# Patient Record
Sex: FEMALE | Race: WHITE | Employment: STUDENT | URBAN - METROPOLITAN AREA
[De-identification: names, ages, dates, MRNs, and addresses within clinical notes are randomized per-mention and may not be internally consistent; named-entity substitution may affect disease eponyms.]

---

## 2017-01-26 DIAGNOSIS — M45.9 ANKYLOSING SPONDYLITIS (H): Primary | ICD-10-CM

## 2017-01-26 DIAGNOSIS — M08.80 JIA (JUVENILE IDIOPATHIC ARTHRITIS), ENTHESITIS RELATED ARTHRITIS (H): Primary | ICD-10-CM

## 2017-01-26 RX ORDER — FOLIC ACID 1 MG/1
2 TABLET ORAL DAILY
Qty: 180 TABLET | Refills: 3 | Status: SHIPPED | OUTPATIENT
Start: 2017-01-26 | End: 2017-10-06

## 2017-01-26 RX ORDER — FOLIC ACID 1 MG/1
1 TABLET ORAL DAILY
Qty: 90 TABLET | Refills: 3 | Status: CANCELLED | OUTPATIENT
Start: 2017-01-26

## 2017-04-10 DIAGNOSIS — M45.9 ANKYLOSING SPONDYLITIS (H): ICD-10-CM

## 2017-05-15 ENCOUNTER — OFFICE VISIT (OUTPATIENT)
Dept: RHEUMATOLOGY | Facility: CLINIC | Age: 22
End: 2017-05-15
Attending: PEDIATRICS
Payer: COMMERCIAL

## 2017-05-15 VITALS
HEIGHT: 63 IN | SYSTOLIC BLOOD PRESSURE: 109 MMHG | HEART RATE: 81 BPM | TEMPERATURE: 98.2 F | WEIGHT: 153.22 LBS | BODY MASS INDEX: 27.15 KG/M2 | DIASTOLIC BLOOD PRESSURE: 75 MMHG

## 2017-05-15 DIAGNOSIS — M45.9 ANKYLOSING SPONDYLITIS (H): Primary | ICD-10-CM

## 2017-05-15 LAB
ALBUMIN SERPL-MCNC: 3.4 G/DL (ref 3.4–5)
ALBUMIN UR-MCNC: NEGATIVE MG/DL
ALP SERPL-CCNC: 61 U/L (ref 40–150)
ALT SERPL W P-5'-P-CCNC: 18 U/L (ref 0–50)
APPEARANCE UR: CLEAR
AST SERPL W P-5'-P-CCNC: 14 U/L (ref 0–35)
BACTERIA #/AREA URNS HPF: ABNORMAL /HPF
BASOPHILS # BLD AUTO: 0 10E9/L (ref 0–0.2)
BASOPHILS NFR BLD AUTO: 0.2 %
BILIRUB DIRECT SERPL-MCNC: <0.1 MG/DL (ref 0–0.2)
BILIRUB SERPL-MCNC: 0.3 MG/DL (ref 0.2–1.3)
BILIRUB UR QL STRIP: NEGATIVE
COLOR UR AUTO: ABNORMAL
CREAT SERPL-MCNC: 0.64 MG/DL (ref 0.5–1)
CRP SERPL-MCNC: 2.9 MG/L (ref 0–8)
DIFFERENTIAL METHOD BLD: NORMAL
EOSINOPHIL # BLD AUTO: 0 10E9/L (ref 0–0.7)
EOSINOPHIL NFR BLD AUTO: 0.8 %
ERYTHROCYTE [DISTWIDTH] IN BLOOD BY AUTOMATED COUNT: 13.1 % (ref 10–15)
ERYTHROCYTE [SEDIMENTATION RATE] IN BLOOD BY WESTERGREN METHOD: 8 MM/H (ref 0–20)
GFR SERPL CREATININE-BSD FRML MDRD: NORMAL ML/MIN/1.7M2
GLUCOSE UR STRIP-MCNC: NEGATIVE MG/DL
HCT VFR BLD AUTO: 36.7 % (ref 35–47)
HGB BLD-MCNC: 12.1 G/DL (ref 11.7–15.7)
HGB UR QL STRIP: NEGATIVE
IMM GRANULOCYTES # BLD: 0 10E9/L (ref 0–0.4)
IMM GRANULOCYTES NFR BLD: 0 %
KETONES UR STRIP-MCNC: NEGATIVE MG/DL
LEUKOCYTE ESTERASE UR QL STRIP: ABNORMAL
LYMPHOCYTES # BLD AUTO: 2.4 10E9/L (ref 0.8–5.3)
LYMPHOCYTES NFR BLD AUTO: 46.3 %
MCH RBC QN AUTO: 28.6 PG (ref 26.5–33)
MCHC RBC AUTO-ENTMCNC: 33 G/DL (ref 31.5–36.5)
MCV RBC AUTO: 87 FL (ref 78–100)
MONOCYTES # BLD AUTO: 0.3 10E9/L (ref 0–1.3)
MONOCYTES NFR BLD AUTO: 6.3 %
NEUTROPHILS # BLD AUTO: 2.4 10E9/L (ref 1.6–8.3)
NEUTROPHILS NFR BLD AUTO: 46.4 %
NITRATE UR QL: NEGATIVE
NRBC # BLD AUTO: 0 10*3/UL
NRBC BLD AUTO-RTO: 0 /100
PH UR STRIP: 6.5 PH (ref 5–7)
PLATELET # BLD AUTO: 255 10E9/L (ref 150–450)
PROT SERPL-MCNC: 7.1 G/DL (ref 6.8–8.8)
RBC # BLD AUTO: 4.23 10E12/L (ref 3.8–5.2)
RBC #/AREA URNS AUTO: 1 /HPF (ref 0–2)
SP GR UR STRIP: 1.02 (ref 1–1.03)
SQUAMOUS #/AREA URNS AUTO: 5 /HPF (ref 0–1)
URN SPEC COLLECT METH UR: ABNORMAL
UROBILINOGEN UR STRIP-MCNC: NORMAL MG/DL (ref 0–2)
WBC # BLD AUTO: 5.1 10E9/L (ref 4–11)
WBC #/AREA URNS AUTO: 1 /HPF (ref 0–2)

## 2017-05-15 PROCEDURE — 86140 C-REACTIVE PROTEIN: CPT | Performed by: PEDIATRICS

## 2017-05-15 PROCEDURE — 99213 OFFICE O/P EST LOW 20 MIN: CPT | Mod: ZF

## 2017-05-15 PROCEDURE — 85025 COMPLETE CBC W/AUTO DIFF WBC: CPT | Performed by: PEDIATRICS

## 2017-05-15 PROCEDURE — 81001 URINALYSIS AUTO W/SCOPE: CPT | Performed by: PEDIATRICS

## 2017-05-15 PROCEDURE — 85652 RBC SED RATE AUTOMATED: CPT | Performed by: PEDIATRICS

## 2017-05-15 PROCEDURE — 36415 COLL VENOUS BLD VENIPUNCTURE: CPT | Performed by: PEDIATRICS

## 2017-05-15 PROCEDURE — 80076 HEPATIC FUNCTION PANEL: CPT | Performed by: PEDIATRICS

## 2017-05-15 PROCEDURE — 82565 ASSAY OF CREATININE: CPT | Performed by: PEDIATRICS

## 2017-05-15 ASSESSMENT — PAIN SCALES - GENERAL: PAINLEVEL: NO PAIN (0)

## 2017-05-15 NOTE — PATIENT INSTRUCTIONS
[facility] Pediatric Rheumatology    For Help:  The Pediatric Call Center at [phone] can help with scheduling of routine follow up visits.  Diamond Stewart and Dana Pichardo are the Nurse Coordinators for the Division of Pediatric Rheumatology and can be reached directly at [phone]. They can help with questions about your child s rheumatic condition, medications, and test results.   Please try to schedule infusions 3 months in advance.  Please try to give us 72 hours or longer notice if you need to cancel infusions so other patients can benefit from this opening).  Note: Insurance authorization must be obtained before any infusion can be scheduled. If you change health insurance, you must notify our office as soon as possible, so that the infusion can be reauthorized.    For emergencies after hours or on the weekends, please call the page  at [phone] and ask to speak to the physician on-call for Pediatric Rheumatology. Please do not use [employer] for urgent requests.  Main  Services:  [phone]  o Hmong/Daryl/Central African: [phone]  o Central African: [phone]  o Faroese: [phone]

## 2017-05-15 NOTE — MR AVS SNAPSHOT
After Visit Summary   5/15/2017    Jaye Nicolas    MRN: [id number]           Patient Information     Date Of Birth          1995        Visit Information        Provider Department      5/15/2017 8:40 AM Ernesto Canela MD Peds Rheumatology        Today's Diagnoses     Ankylosing spondylitis (H)    -  1      Care Instructions        [facility] Pediatric Rheumatology    For Help:  The Pediatric Call Center at [phone] can help with scheduling of routine follow up visits.  Diamond Stewart and Dana Pichardo are the Nurse Coordinators for the Division of Pediatric Rheumatology and can be reached directly at [phone]. They can help with questions about your child s rheumatic condition, medications, and test results.   Please try to schedule infusions 3 months in advance.  Please try to give us 72 hours or longer notice if you need to cancel infusions so other patients can benefit from this opening).  Note: Insurance authorization must be obtained before any infusion can be scheduled. If you change health insurance, you must notify our office as soon as possible, so that the infusion can be reauthorized.    For emergencies after hours or on the weekends, please call the page  at [phone] and ask to speak to the physician on-call for Pediatric Rheumatology. Please do not use [employer] for urgent requests.  Main  Services:  [phone]  o Hmong/Daryl/British: [phone]  o Kuwaiti: [phone]  o Georgian: [phone]          Follow-ups after your visit        Follow-up notes from your care team     Return in about 7 months (around 12/15/2017) for Routine Visit.      Your next 10 appointments already scheduled     Dec 21, 2017  8:40 AM CST   Return Visit with MD Marlen Kinney Rheumatology ([facility])    [facility]  12th Floor  [address] [address] [phone]              Who to contact      "Please call your clinic at [phone] to:    Ask questions about your health    Make or cancel appointments    Discuss your medicines    Learn about your test results    Speak to your doctor   If you have compliments or concerns about an experience at your clinic, or if you wish to file a complaint, please contact [facility] Patient Relations at [phone] or email us at [email or web address]         Additional Information About Your Visit        [facility] Information     [employer] gives you secure access to your electronic health record. If you see a primary care provider, you can also send messages to your care team and make appointments. If you have questions, please call your primary care clinic.  If you do not have a primary care provider, please call [phone] and they will assist you.      [employer] is an electronic gateway that provides easy, online access to your medical records. With EVERYWARE, you can request a clinic appointment, read your test results, renew a prescription or communicate with your care team.     To access your existing account, please contact your [facility] or call [phone] for assistance.        Care EveryWhere ID     This is your Care EveryWhere ID. This could be used by other organizations to access your [place] medical records  [id number]        Your Vitals Were     Pulse Temperature Height BMI (Body Mass Index)          81 98.2  F (36.8  C) (Oral) 5' 2.91\" (159.8 cm) 27.22 kg/m2         Blood Pressure from Last 3 Encounters:   05/15/17 109/75   12/22/16 107/62   07/01/16 117/74    Weight from Last 3 Encounters:   05/15/17 153 lb 3.5 oz (69.5 kg) (83 %)*   12/22/16 153 lb 3.5 oz (69.5 kg) (84 %)*   07/01/16 140 lb 3.4 oz (63.6 kg) (73 %)*     * Growth percentiles are based on CDC 2-20 Years data.              We Performed the Following     CBC with platelets differential     Creatinine     CRP inflammation  "    Hepatic panel     RBC  Sed Rate     Routine UA with microscopic        Primary Care Provider Office Phone # Fax #    Xuan Quintana [phone]       [address] [place] [address]        Thank you!     [facility] you [facility]  [facility] your care. Our goal is always to provide you with excellent care. Hearing back from our patients is one way we can continue to improve our services. Please take a few minutes to complete the written survey that you may receive in the mail after your visit with us. Thank you!             Your Updated Medication List - Protect others around you: Learn how to safely use, store and throw away your medicines at www.disposemymeds.org.          This list is accurate as of: 5/15/17  9:30 AM.  Always use your most recent med list.                   Brand Name Dispense Instructions for use    BEYAZ 3-0.02-0.451 MG Tabs   Generic drug:  Drospiren-Eth Estrad-Levomefol      Take 1 tablet by mouth daily       Etanercept 50 MG/ML autoinjector    ENBREL SURECLICK    4 mL    Inject 50 mg Subcutaneous once a week       folic acid 1 MG tablet    FOLVITE    180 tablet    Take 2 tablets (2 mg) by mouth daily       loratadine 10 MG tablet    CLARITIN     Take 10 mg by mouth daily       meloxicam 15 MG tablet    MOBIC    90 tablet    Take 1 tablet (15 mg) by mouth daily       predniSONE 20 MG tablet    DELTASONE    15 tablet    Take 1 tablet (20 mg) by mouth daily

## 2017-05-15 NOTE — NURSING NOTE
"Chief Complaint   Patient presents with     Follow Up For     Ankylosing spondylitis     /75 (BP Location: Right arm, Patient Position: Chair, Cuff Size: Adult Regular)  Pulse 81  Temp 98.2  F (36.8  C) (Oral)  Ht 5' 2.91\" (159.8 cm)  Wt 153 lb 3.5 oz (69.5 kg)  BMI 27.22 kg/m2    /Radha Hernandez LPN    "

## 2017-05-15 NOTE — PROGRESS NOTES
Problem list:     Patient Active Problem List    Diagnosis Date Noted     Ankylosing spondylitis (H) 02/04/2015     Sacroiliitis, enthesitis, tenosynovitis            Allergies:     Allergies   Allergen Reactions     Seasonal Allergies            Medications:     As of completion of this visit:  Current Outpatient Prescriptions   Medication Sig Dispense Refill     Etanercept (ENBREL SURECLICK) 50 MG/ML autoinjector Inject 50 mg Subcutaneous once a week 4 mL 12     folic acid (FOLVITE) 1 MG tablet Take 2 tablets (2 mg) by mouth daily 180 tablet 3     predniSONE (DELTASONE) 20 MG tablet Take 1 tablet (20 mg) by mouth daily 15 tablet 3     meloxicam (MOBIC) 15 MG tablet Take 1 tablet (15 mg) by mouth daily 90 tablet 3     loratadine (CLARITIN) 10 MG tablet Take 10 mg by mouth daily       Drospiren-Eth Estrad-Levomefol (BEYAZ) 3-0.02-0.451 MG TABS Take 1 tablet by mouth daily        Jaye has been receiving and tolerating her medications well, without missed doses or notable side effects.         Subjective:     Jaye is a 21 year old female who was seen in Pediatric Rheumatology clinic today for follow up.  Jaye was last seen in our clinic on 12/22/2016 and returns today accompanied by her mother.  The encounter diagnosis was Ankylosing spondylitis (H).      Jaye had a good second semester and is now back home for the summer.  [place]  She has not had any major trouble with her joints, although she did finally mention that she had had some trouble with back discomfort at different times.  The 19 hour drive here is one of those examples of where she could have some trouble.  Otherwise, she has mostly been able to do things fine and has not felt the need to have joint injections done again.  She has received her Enbrel quite regularly, although today she has some nasal stuffiness, so they were thinking of holding it.  I reviewed with them again that we primarily  "hold this for an infection that requires antimicrobial therapy, not for congestion due to allergies or for minor upper respiratory infections such as cold viruses. Comprehensive Review of Systems is otherwise negative.    Information per our standardized questionnaire is as below:  Last Exam  Last Eye Exam: 11/21/16  Last Radiograph : 08/06/14  Self Report  Patient Pain Status: No Pain   Patient Global Assessment Of Disease Activity: Very Good  Score Reported By: Self  Arthritis History  Morning stiffness in the past week: < or equal to 15 min  Has your arthritis stopped from trying any athletic or rigorous activities, or interfaced with your ability to do these activities: No  Have you been limited your ability to do normal daily activities in the past week: No  Did you needed help from other people to do normal activities in the past week: No  Have you used any aids or devices to help you do normal daily activities in the past week: No  Important Medical Events  Hospitalized Since Last Visit: No         Examination:     Blood pressure 109/75, pulse 81, temperature 98.2  F (36.8  C), temperature source Oral, height 5' 2.91\" (159.8 cm), weight 153 lb 3.5 oz (69.5 kg).  Jaye appears generally well and in good spirits.  Head: Normal head and hair.  Eyes: No scleral injection, pupils normal.  Ears: Normal external structures, tympanic membranes.  Nose: No cartilage deformity, congestion.  Mouth: Normal teeth, gums, tongue, mucosa.  Throat: Normal, without erythema or exudate.  Neck: Normal, without thyromegaly  Nodes: No cervical, supraclavicular, axillary, inguinal adenopathy.  Lungs: Normal effort, clear to ausculation.  Heart: Regular rate and rhythm, S1 and S2, no murmurs; normal peripheral pulses and perfusion.  Abdomen: Soft, non-tender, without hepatomegaly, splenomegaly, or masses.  Skin: No inflammatory lesions, only normal scratches and bruises.  Nails: No pitting, infection.  Neurological: Alert, " appropriately interactive, normal cranial nerves, no deficits, normal gait.  Musculoskeletal: No evidence of current synovitis of the cervical spine, TMJ, sternoclavicular, acromioclavicular, glenohumeral, elbow, wrist, finger, lumbar spine, hip, knee, ankle, or toe joints. No tendonitis or bursitis. No enthesitis.   Axial Skeleton  Sacro-Iliac: R Tender, L Tender           Last Imaging Results:     Results for orders placed or performed during the hospital encounter of 01/18/16   CT SI Joint Injection    Narrative    CT guided bilateral sacroiliac joint injection: 1/18/2016    History: Pain.    Procedure: Informed consent was obtained from the patient. In prone  position, initial CT images were obtained. Overlying skin was prepped,  draped, and anesthetized. 22-gauge spinal needle was advanced into  both sacroiliac joints under intermittent CT guidance. After  confirmation of intra-articular position, 1 cc of 40 mg Kenalog mixed  with 1 cc 0.25% bupivacaine was injected into each sacroiliac joint.  Needle was removed. No complications. Estimated blood loss less than 1  cc.    Preprocedural pain rated at 7 / 10.  Postprocedural pain rated at zero / 10.      Impression    Impression: Successful CT-guided bilateral sacroiliac joint steroid  and anesthetic injection.    MARIAH BAKER MD            Assessment:     Jaye has a highly reassuring interval history and examination.  While she does have some mild SI tenderness, there could be mechanical factors playing a role here.  I do not think it is essential that anything change about her therapy today, but we certainly could reinject her SI joints this summer if needed (it was last done in 01/2016).     Change Since Last Visit: Same  ACR Functional Class: Normal  Provider Global Assessment Of Disease Activity: Inactive (0)  (This is measured on the scale of 0 - 10)  On Medication For Treatment Of AISHWARYA?: Yes  Normal ESR: Normal, or abnormality not due to AISHWARYA  Normal CRP:  Normal, or abnormality not due to AISHWARYA  BRANDON Status: Negative  Rheumatoid Factor Status: Negative  HLA-B27 Status: Negative  In Compliance With Screening Interval For AISHWARYA: Yes         Plan:     1. She will stay on her current therapy and have monitoring laboratory studies today.    2. No imaging studies are needed today.    3. I recommend a once a year eye examination.    4. If she has additional difficulty with her back, we can discuss PT or SI joint injections.  I would recommend calling by 08/01 if she wants to get this done before she heads back to school to avoid any difficulty with scheduling.    5. I asked to see her back on winter break.  She will be due for labs again at that time.    If there are any new questions or concerns, I would be glad to help and can be reached through our main office at [phone] or our paging  at [phone].    Ernesto Canela MD         Addendum:  Laboratory Investigations:   These results are reassuring.   Results for orders placed or performed in visit on 05/15/17 (from the past 48 hour(s))   CBC with platelets differential   Result Value Ref Range    WBC 5.1 4.0 - 11.0 10e9/L    RBC Count 4.23 3.8 - 5.2 10e12/L    Hemoglobin 12.1 11.7 - 15.7 g/dL    Hematocrit 36.7 35.0 - 47.0 %    MCV 87 78 - 100 fl    MCH 28.6 26.5 - 33.0 pg    MCHC 33.0 31.5 - 36.5 g/dL    RDW 13.1 10.0 - 15.0 %    Platelet Count 255 150 - 450 10e9/L    Diff Method Automated Method     % Neutrophils 46.4 %    % Lymphocytes 46.3 %    % Monocytes 6.3 %    % Eosinophils 0.8 %    % Basophils 0.2 %    % Immature Granulocytes 0.0 %    Nucleated RBCs 0 0 /100    Absolute Neutrophil 2.4 1.6 - 8.3 10e9/L    Absolute Lymphocytes 2.4 0.8 - 5.3 10e9/L    Absolute Monocytes 0.3 0.0 - 1.3 10e9/L    Absolute Eosinophils 0.0 0.0 - 0.7 10e9/L    Absolute Basophils 0.0 0.0 - 0.2 10e9/L    Abs Immature Granulocytes 0.0 0 - 0.4 10e9/L    Absolute Nucleated RBC 0.0    Creatinine   Result Value Ref Range     Creatinine 0.64 0.50 - 1.00 mg/dL    GFR Estimate >90  Non  GFR Calc   >60 mL/min/1.7m2    GFR Estimate If Black >90   GFR Calc   >60 mL/min/1.7m2   Hepatic panel   Result Value Ref Range    Bilirubin Direct <0.1 0.0 - 0.2 mg/dL    Bilirubin Total 0.3 0.2 - 1.3 mg/dL    Albumin 3.4 3.4 - 5.0 g/dL    Protein Total 7.1 6.8 - 8.8 g/dL    Alkaline Phosphatase 61 40 - 150 U/L    ALT 18 0 - 50 U/L    AST 14 0 - 35 U/L   CRP inflammation   Result Value Ref Range    CRP Inflammation 2.9 0.0 - 8.0 mg/L   RBC  Sed Rate   Result Value Ref Range    Sed Rate 8 0 - 20 mm/h   Routine UA with microscopic   Result Value Ref Range    Color Urine Light Yellow     Appearance Urine Clear     Glucose Urine Negative NEG mg/dL    Bilirubin Urine Negative NEG    Ketones Urine Negative NEG mg/dL    Specific Gravity Urine 1.016 1.003 - 1.035    Blood Urine Negative NEG    pH Urine 6.5 5.0 - 7.0 pH    Protein Albumin Urine Negative NEG mg/dL    Urobilinogen mg/dL Normal 0.0 - 2.0 mg/dL    Nitrite Urine Negative NEG    Leukocyte Esterase Urine Trace (A) NEG    Source Midstream Urine     WBC Urine 1 0 - 2 /HPF    RBC Urine 1 0 - 2 /HPF    Bacteria Urine Few (A) NEG /HPF    Squamous Epithelial /HPF Urine 5 (H) 0 - 1 /HPF        CC  Patient Care Team:  Xuan Quintana MD as PCP - General (Pediatrics)  Ernesto Canela MD as MD (Pediatrics)  Xuan Quintana MD as Referring Physician (Pediatrics)  Yonis Christine TRACIE F    Copy to patient  Maria Isabel, Nery Maria Isabel, Arnold  [address]  [facility] [address]

## 2017-05-15 NOTE — LETTER
5/15/2017      RE: Jaye Nicolas  [address]  [place] [address]           Problem list:     Patient Active Problem List    Diagnosis Date Noted     Ankylosing spondylitis (H) 02/04/2015     Sacroiliitis, enthesitis, tenosynovitis            Allergies:     Allergies   Allergen Reactions     Seasonal Allergies            Medications:     As of completion of this visit:  Current Outpatient Prescriptions   Medication Sig Dispense Refill     Etanercept (ENBREL SURECLICK) 50 MG/ML autoinjector Inject 50 mg Subcutaneous once a week 4 mL 12     folic acid (FOLVITE) 1 MG tablet Take 2 tablets (2 mg) by mouth daily 180 tablet 3     predniSONE (DELTASONE) 20 MG tablet Take 1 tablet (20 mg) by mouth daily 15 tablet 3     meloxicam (MOBIC) 15 MG tablet Take 1 tablet (15 mg) by mouth daily 90 tablet 3     loratadine (CLARITIN) 10 MG tablet Take 10 mg by mouth daily       Drospiren-Eth Estrad-Levomefol (BEYAZ) 3-0.02-0.451 MG TABS Take 1 tablet by mouth daily        Jaye has been receiving and tolerating her medications well, without missed doses or notable side effects.         Subjective:     Jaye is a 21 year old female who was seen in Pediatric Rheumatology clinic today for follow up.  Jaye was last seen in our clinic on 12/22/2016 and returns today accompanied by her mother.  The encounter diagnosis was Ankylosing spondylitis (H).      Jaye had a good second semester and is now back home for the summer.  [place]  She has not had any major trouble with her joints, although she did finally mention that she had had some trouble with back discomfort at different times.  The 19 hour drive here is one of those examples of where she could have some trouble.  Otherwise, she has mostly been able to do things fine and has not felt the need to have joint injections done again.  She has received her Enbrel quite regularly, although today she has some nasal stuffiness,  "so they were thinking of holding it.  I reviewed with them again that we primarily hold this for an infection that requires antimicrobial therapy, not for congestion due to allergies or for minor upper respiratory infections such as cold viruses. Comprehensive Review of Systems is otherwise negative.    Information per our standardized questionnaire is as below:  Last Exam  Last Eye Exam: 11/21/16  Last Radiograph : 08/06/14  Self Report  Patient Pain Status: No Pain   Patient Global Assessment Of Disease Activity: Very Good  Score Reported By: Self  Arthritis History  Morning stiffness in the past week: < or equal to 15 min  Has your arthritis stopped from trying any athletic or rigorous activities, or interfaced with your ability to do these activities: No  Have you been limited your ability to do normal daily activities in the past week: No  Did you needed help from other people to do normal activities in the past week: No  Have you used any aids or devices to help you do normal daily activities in the past week: No  Important Medical Events  Hospitalized Since Last Visit: No         Examination:     Blood pressure 109/75, pulse 81, temperature 98.2  F (36.8  C), temperature source Oral, height 5' 2.91\" (159.8 cm), weight 153 lb 3.5 oz (69.5 kg).  Jaye appears generally well and in good spirits.  Head: Normal head and hair.  Eyes: No scleral injection, pupils normal.  Ears: Normal external structures, tympanic membranes.  Nose: No cartilage deformity, congestion.  Mouth: Normal teeth, gums, tongue, mucosa.  Throat: Normal, without erythema or exudate.  Neck: Normal, without thyromegaly  Nodes: No cervical, supraclavicular, axillary, inguinal adenopathy.  Lungs: Normal effort, clear to ausculation.  Heart: Regular rate and rhythm, S1 and S2, no murmurs; normal peripheral pulses and perfusion.  Abdomen: Soft, non-tender, without hepatomegaly, splenomegaly, or masses.  Skin: No inflammatory lesions, only normal " scratches and bruises.  Nails: No pitting, infection.  Neurological: Alert, appropriately interactive, normal cranial nerves, no deficits, normal gait.  Musculoskeletal: No evidence of current synovitis of the cervical spine, TMJ, sternoclavicular, acromioclavicular, glenohumeral, elbow, wrist, finger, lumbar spine, hip, knee, ankle, or toe joints. No tendonitis or bursitis. No enthesitis.   Axial Skeleton  Sacro-Iliac: R Tender, L Tender           Last Imaging Results:     Results for orders placed or performed during the hospital encounter of 01/18/16   CT SI Joint Injection    Narrative    CT guided bilateral sacroiliac joint injection: 1/18/2016    History: Pain.    Procedure: Informed consent was obtained from the patient. In prone  position, initial CT images were obtained. Overlying skin was prepped,  draped, and anesthetized. 22-gauge spinal needle was advanced into  both sacroiliac joints under intermittent CT guidance. After  confirmation of intra-articular position, 1 cc of 40 mg Kenalog mixed  with 1 cc 0.25% bupivacaine was injected into each sacroiliac joint.  Needle was removed. No complications. Estimated blood loss less than 1  cc.    Preprocedural pain rated at 7 / 10.  Postprocedural pain rated at zero / 10.      Impression    Impression: Successful CT-guided bilateral sacroiliac joint steroid  and anesthetic injection.    MARIAH BAKER MD            Assessment:     Jaye has a highly reassuring interval history and examination.  While she does have some mild SI tenderness, there could be mechanical factors playing a role here.  I do not think it is essential that anything change about her therapy today, but we certainly could reinject her SI joints this summer if needed (it was last done in 01/2016).     Change Since Last Visit: Same  ACR Functional Class: Normal  Provider Global Assessment Of Disease Activity: Inactive (0)  (This is measured on the scale of 0 - 10)  On Medication For Treatment  Of AISHWARYA?: Yes  Normal ESR: Normal, or abnormality not due to AISHWARYA  Normal CRP: Normal, or abnormality not due to AISHWARYA  BRANDON Status: Negative  Rheumatoid Factor Status: Negative  HLA-B27 Status: Negative  In Compliance With Screening Interval For AISHWARYA: Yes         Plan:     1. She will stay on her current therapy and have monitoring laboratory studies today.    2. No imaging studies are needed today.    3. I recommend a once a year eye examination.    4. If she has additional difficulty with her back, we can discuss PT or SI joint injections.  I would recommend calling by 08/01 if she wants to get this done before she heads back to school to avoid any difficulty with scheduling.    5. I asked to see her back on winter break.  She will be due for labs again at that time.    If there are any new questions or concerns, I would be glad to help and can be reached through our main office at [phone] or our paging  at [phone].    Enresto Canela MD         Addendum:  Laboratory Investigations:   These results are reassuring.   Results for orders placed or performed in visit on 05/15/17 (from the past 48 hour(s))   CBC with platelets differential   Result Value Ref Range    WBC 5.1 4.0 - 11.0 10e9/L    RBC Count 4.23 3.8 - 5.2 10e12/L    Hemoglobin 12.1 11.7 - 15.7 g/dL    Hematocrit 36.7 35.0 - 47.0 %    MCV 87 78 - 100 fl    MCH 28.6 26.5 - 33.0 pg    MCHC 33.0 31.5 - 36.5 g/dL    RDW 13.1 10.0 - 15.0 %    Platelet Count 255 150 - 450 10e9/L    Diff Method Automated Method     % Neutrophils 46.4 %    % Lymphocytes 46.3 %    % Monocytes 6.3 %    % Eosinophils 0.8 %    % Basophils 0.2 %    % Immature Granulocytes 0.0 %    Nucleated RBCs 0 0 /100    Absolute Neutrophil 2.4 1.6 - 8.3 10e9/L    Absolute Lymphocytes 2.4 0.8 - 5.3 10e9/L    Absolute Monocytes 0.3 0.0 - 1.3 10e9/L    Absolute Eosinophils 0.0 0.0 - 0.7 10e9/L    Absolute Basophils 0.0 0.0 - 0.2 10e9/L    Abs Immature Granulocytes 0.0 0 - 0.4 10e9/L     Absolute Nucleated RBC 0.0    Creatinine   Result Value Ref Range    Creatinine 0.64 0.50 - 1.00 mg/dL    GFR Estimate >90  Non  GFR Calc   >60 mL/min/1.7m2    GFR Estimate If Black >90   GFR Calc   >60 mL/min/1.7m2   Hepatic panel   Result Value Ref Range    Bilirubin Direct <0.1 0.0 - 0.2 mg/dL    Bilirubin Total 0.3 0.2 - 1.3 mg/dL    Albumin 3.4 3.4 - 5.0 g/dL    Protein Total 7.1 6.8 - 8.8 g/dL    Alkaline Phosphatase 61 40 - 150 U/L    ALT 18 0 - 50 U/L    AST 14 0 - 35 U/L   CRP inflammation   Result Value Ref Range    CRP Inflammation 2.9 0.0 - 8.0 mg/L   RBC  Sed Rate   Result Value Ref Range    Sed Rate 8 0 - 20 mm/h   Routine UA with microscopic   Result Value Ref Range    Color Urine Light Yellow     Appearance Urine Clear     Glucose Urine Negative NEG mg/dL    Bilirubin Urine Negative NEG    Ketones Urine Negative NEG mg/dL    Specific Gravity Urine 1.016 1.003 - 1.035    Blood Urine Negative NEG    pH Urine 6.5 5.0 - 7.0 pH    Protein Albumin Urine Negative NEG mg/dL    Urobilinogen mg/dL Normal 0.0 - 2.0 mg/dL    Nitrite Urine Negative NEG    Leukocyte Esterase Urine Trace (A) NEG    Source Midstream Urine     WBC Urine 1 0 - 2 /HPF    RBC Urine 1 0 - 2 /HPF    Bacteria Urine Few (A) NEG /HPF    Squamous Epithelial /HPF Urine 5 (H) 0 - 1 /HPF        Ernesto Canela MD    CC  Patient Care Team:  Xuan Quintana MD as PCP - General (Pediatrics)    Yonis Christine    Copy to patient  Nery Nicolas Maria Isabel, Arnold  [address]  [facility] [address]

## 2017-07-15 ENCOUNTER — HEALTH MAINTENANCE LETTER (OUTPATIENT)
Age: 22
End: 2017-07-15

## 2017-08-31 ENCOUNTER — MYC REFILL (OUTPATIENT)
Dept: RHEUMATOLOGY | Facility: CLINIC | Age: 22
End: 2017-08-31

## 2017-08-31 DIAGNOSIS — M46.1 SACROILIITIS (H): ICD-10-CM

## 2017-08-31 DIAGNOSIS — M77.9 ENTHESITIS: ICD-10-CM

## 2017-08-31 DIAGNOSIS — M65.90 TENOSYNOVITIS: ICD-10-CM

## 2017-08-31 RX ORDER — MELOXICAM 15 MG/1
15 TABLET ORAL DAILY
Qty: 90 TABLET | Refills: 1 | Status: SHIPPED | OUTPATIENT
Start: 2017-08-31 | End: 2018-02-20

## 2017-08-31 NOTE — TELEPHONE ENCOUNTER
Message from [employer]:  Original authorizing provider: Ernesto Canela MD    Jaye Nicolas would like a refill of the following medications:  meloxicam (MOBIC) 15 MG tablet [Ernesto Canela MD]    Preferred pharmacy: [facility] [address] [place], [place] [address] [facility] [address] AT [facility] [address]  & [address] [facility]    Comment:  Can you send it to [facility] [address] [place] [facility] [phone] 90 day supply would be great, as it needs to get to [place]. Thanks so much!!

## 2017-10-06 DIAGNOSIS — M08.80 JIA (JUVENILE IDIOPATHIC ARTHRITIS), ENTHESITIS RELATED ARTHRITIS (H): ICD-10-CM

## 2017-10-06 DIAGNOSIS — M45.9 ANKYLOSING SPONDYLITIS (H): Primary | ICD-10-CM

## 2017-10-06 RX ORDER — FOLIC ACID 1 MG/1
2 TABLET ORAL DAILY
Qty: 180 TABLET | Refills: 3 | Status: SHIPPED | OUTPATIENT
Start: 2017-10-06 | End: 2017-12-28

## 2017-12-22 ENCOUNTER — OFFICE VISIT (OUTPATIENT)
Dept: RHEUMATOLOGY | Facility: CLINIC | Age: 22
End: 2017-12-22
Attending: PEDIATRICS
Payer: COMMERCIAL

## 2017-12-22 VITALS
SYSTOLIC BLOOD PRESSURE: 105 MMHG | HEART RATE: 71 BPM | WEIGHT: 151.46 LBS | HEIGHT: 63 IN | DIASTOLIC BLOOD PRESSURE: 62 MMHG | TEMPERATURE: 98.1 F | BODY MASS INDEX: 26.84 KG/M2

## 2017-12-22 DIAGNOSIS — M45.8 ANKYLOSING SPONDYLITIS OF SACRAL REGION (H): Primary | ICD-10-CM

## 2017-12-22 LAB
ALBUMIN SERPL-MCNC: 3.2 G/DL (ref 3.4–5)
ALBUMIN UR-MCNC: 10 MG/DL
ALP SERPL-CCNC: 67 U/L (ref 40–150)
ALT SERPL W P-5'-P-CCNC: 18 U/L (ref 0–50)
APPEARANCE UR: ABNORMAL
AST SERPL W P-5'-P-CCNC: 13 U/L (ref 0–45)
BACTERIA #/AREA URNS HPF: ABNORMAL /HPF
BASOPHILS # BLD AUTO: 0 10E9/L (ref 0–0.2)
BASOPHILS NFR BLD AUTO: 0.1 %
BILIRUB DIRECT SERPL-MCNC: <0.1 MG/DL (ref 0–0.2)
BILIRUB SERPL-MCNC: 0.3 MG/DL (ref 0.2–1.3)
BILIRUB UR QL STRIP: NEGATIVE
COLOR UR AUTO: YELLOW
CREAT SERPL-MCNC: 0.65 MG/DL (ref 0.52–1.04)
CRP SERPL-MCNC: <2.9 MG/L (ref 0–8)
DIFFERENTIAL METHOD BLD: ABNORMAL
EOSINOPHIL # BLD AUTO: 0.1 10E9/L (ref 0–0.7)
EOSINOPHIL NFR BLD AUTO: 0.7 %
ERYTHROCYTE [DISTWIDTH] IN BLOOD BY AUTOMATED COUNT: 13.2 % (ref 10–15)
ERYTHROCYTE [SEDIMENTATION RATE] IN BLOOD BY WESTERGREN METHOD: 9 MM/H (ref 0–20)
GFR SERPL CREATININE-BSD FRML MDRD: >90 ML/MIN/1.7M2
GLUCOSE UR STRIP-MCNC: NEGATIVE MG/DL
HCT VFR BLD AUTO: 35.1 % (ref 35–47)
HGB BLD-MCNC: 11.6 G/DL (ref 11.7–15.7)
HGB UR QL STRIP: NEGATIVE
IMM GRANULOCYTES # BLD: 0 10E9/L (ref 0–0.4)
IMM GRANULOCYTES NFR BLD: 0.1 %
KETONES UR STRIP-MCNC: NEGATIVE MG/DL
LEUKOCYTE ESTERASE UR QL STRIP: NEGATIVE
LYMPHOCYTES # BLD AUTO: 2.1 10E9/L (ref 0.8–5.3)
LYMPHOCYTES NFR BLD AUTO: 27.8 %
MCH RBC QN AUTO: 28.6 PG (ref 26.5–33)
MCHC RBC AUTO-ENTMCNC: 33 G/DL (ref 31.5–36.5)
MCV RBC AUTO: 87 FL (ref 78–100)
MONOCYTES # BLD AUTO: 0.5 10E9/L (ref 0–1.3)
MONOCYTES NFR BLD AUTO: 7.2 %
MUCOUS THREADS #/AREA URNS LPF: PRESENT /LPF
NEUTROPHILS # BLD AUTO: 4.8 10E9/L (ref 1.6–8.3)
NEUTROPHILS NFR BLD AUTO: 64.1 %
NITRATE UR QL: NEGATIVE
NRBC # BLD AUTO: 0 10*3/UL
NRBC BLD AUTO-RTO: 0 /100
PH UR STRIP: 7 PH (ref 5–7)
PLATELET # BLD AUTO: 242 10E9/L (ref 150–450)
PROT SERPL-MCNC: 6.9 G/DL (ref 6.8–8.8)
RBC # BLD AUTO: 4.06 10E12/L (ref 3.8–5.2)
RBC #/AREA URNS AUTO: <1 /HPF (ref 0–2)
SOURCE: ABNORMAL
SP GR UR STRIP: 1.02 (ref 1–1.03)
SQUAMOUS #/AREA URNS AUTO: 6 /HPF (ref 0–1)
UROBILINOGEN UR STRIP-MCNC: NORMAL MG/DL (ref 0–2)
WBC # BLD AUTO: 7.5 10E9/L (ref 4–11)
WBC #/AREA URNS AUTO: 2 /HPF (ref 0–2)

## 2017-12-22 PROCEDURE — 86140 C-REACTIVE PROTEIN: CPT | Performed by: PEDIATRICS

## 2017-12-22 PROCEDURE — 99213 OFFICE O/P EST LOW 20 MIN: CPT | Mod: ZF

## 2017-12-22 PROCEDURE — 36415 COLL VENOUS BLD VENIPUNCTURE: CPT | Performed by: PEDIATRICS

## 2017-12-22 PROCEDURE — 81001 URINALYSIS AUTO W/SCOPE: CPT | Performed by: PEDIATRICS

## 2017-12-22 PROCEDURE — 82565 ASSAY OF CREATININE: CPT | Performed by: PEDIATRICS

## 2017-12-22 PROCEDURE — 85652 RBC SED RATE AUTOMATED: CPT | Performed by: PEDIATRICS

## 2017-12-22 PROCEDURE — 85025 COMPLETE CBC W/AUTO DIFF WBC: CPT | Performed by: PEDIATRICS

## 2017-12-22 PROCEDURE — 80076 HEPATIC FUNCTION PANEL: CPT | Performed by: PEDIATRICS

## 2017-12-22 ASSESSMENT — PAIN SCALES - GENERAL: PAINLEVEL: NO PAIN (0)

## 2017-12-22 NOTE — LETTER
12/22/2017      RE: Jaye Nicolas  [address]  [place] [address]           Problem list:     Patient Active Problem List    Diagnosis Date Noted     Ankylosing spondylitis of sacral region (H) 02/04/2015     Sacroiliitis, enthesitis, tenosynovitis            Allergies:     Allergies   Allergen Reactions     Seasonal Allergies            Medications:     As of completion of this visit:  Current Outpatient Prescriptions   Medication Sig Dispense Refill     folic acid (FOLVITE) 1 MG tablet Take 2 tablets (2 mg) by mouth daily 180 tablet 3     meloxicam (MOBIC) 15 MG tablet Take 1 tablet (15 mg) by mouth daily 90 tablet 1     Etanercept (ENBREL SURECLICK) 50 MG/ML autoinjector Inject 50 mg Subcutaneous once a week 4 mL 12     loratadine (CLARITIN) 10 MG tablet Take 10 mg by mouth daily       Drospiren-Eth Estrad-Levomefol (BEYAZ) 3-0.02-0.451 MG TABS Take 1 tablet by mouth daily       predniSONE (DELTASONE) 20 MG tablet Take 1 tablet (20 mg) by mouth daily (Patient not taking: Reported on 12/22/2017) 15 tablet 3             Subjective:     Jaye is a 22 year old female who was seen in Pediatric Rheumatology clinic today for follow up.  Jaye was last seen in our clinic on 5/15/2017 and returns today accompanied by her mother.  The encounter diagnosis was Ankylosing spondylitis of sacral region (H).      Jaye did relatively well over the last 7 months as best we can tell.  As usual, she has a tendency to somewhat understate things, but she did not end up coming in over the summer for reinjection of her SI joints because she was doing well enough.  This fall she got ill and missed at least a couple of doses of Enbrel and that is when she particularly started noticed some trouble with her fingers.  She was able to keep up with her work but it did impact her ability to write.  She particularly has noted some swelling of her right fifth finger, but all of her fingers that bothers her to some degree.  Since her  "last treatment with antibiotics around Thanksgiving time she has been infection free.  Comprehensive Review of Systems is otherwise negative.    Information per our standardized questionnaire is as below:  Last Exam  Last Eye Exam: 11/21/16  Last Radiograph : 08/06/14  Self Report  Patient Pain Status: 1.5  Patient Global Assessment Of Disease Activity: 1.5  Score Reported By: Self  Arthritis History  Morning stiffness in the past week: < or equal to 15 min  Has your arthritis stopped from trying any athletic or rigorous activities, or interfaced with your ability to do these activities: No  Have you been limited your ability to do normal daily activities in the past week: No  Did you needed help from other people to do normal activities in the past week: No  Have you used any aids or devices to help you do normal daily activities in the past week: No            Examination:     Blood pressure 105/62, pulse 71, temperature 98.1  F (36.7  C), temperature source Oral, height 5' 2.99\" (160 cm), weight 151 lb 7.3 oz (68.7 kg).    Jaye appears generally well and in good spirits.  Head: Normal head and hair.  Eyes: No scleral injection, pupils normal.  Ears: Normal external structures, tympanic membranes.  Nose: No cartilage deformity, congestion.  Mouth: Normal teeth, gums, tongue, mucosa.  Throat: Normal, without erythema or exudate.  Neck: Normal, without thyromegaly  Nodes: No cervical, supraclavicular, axillary, inguinal adenopathy.  Lungs: Normal effort, clear to ausculation.  Heart: Regular rate and rhythm, S1 and S2, no murmurs; normal peripheral pulses and perfusion.  Abdomen: Soft, non-tender, without hepatomegaly, splenomegaly, or masses.  Skin: No inflammatory lesions, only normal scratches and bruises.  Nails: No pitting, infection.  Neurological: Alert, appropriately interactive, normal cranial nerves, no deficits, normal gait.  Musculoskeletal: No evidence of current synovitis of the cervical spine, TMJ, " sternoclavicular, acromioclavicular, glenohumeral, elbow, wrist, lumbar spine, hip, knee, ankle, or toe joints. No tendonitis or bursitis. No enthesitis.   Axial Skeleton  Sacro-Iliac: R Tender, L Tender (minimal)    Upper Extremity  Thumb MCP: R Swollen  Little PIP: R Swollen           Assessment:     Ankylosing spondylitis with a history of sacroiliitis and enthesitis, and now some new joint complaints.  Given infectious illnesses consider up arthritis, and she had to miss a few doses of Enbrel and not surprised that she has some difficulty.  Her mother made a comment that she seems to young to have problems like this with her fingers but I clarified that this is not an age-related phenomenon.  In addition, I clarified for Jaye that the swelling in her finger joints should go back to normal if she is on effective therapy and there should be nothing visible.    Change Since Last Visit: Same  ACR Functional Class: Normal  Provider Global Assessment Of Disease Activity: Inactive (0)  (This is measured on the scale of 0 - 10)  On Medication For Treatment Of AISHWARYA?: Yes  Normal ESR: Normal, or abnormality not due to AISHWARYA  Normal CRP: Normal, or abnormality not due to AISHWARYA  BRANDON Status: Negative  Rheumatoid Factor Status: Negative  HLA-B27 Status: Negative  In Compliance With Screening Interval For AISHWARYA: Yes         Plan:     1. Laboratory monitoring every 3-4 months to monitor medications and disease activity.  2. No imaging is needed today.  3. Continue eye examinations for uveitis monitoring every 12 months..  4. Medications as listed, with the option of taking prednisone 10 or 20 mg daily until things settle down or the next Enbrel dose can be given.  We discussed a new form of Enbrel that she could try if interested.  If things do not settle down with this plan we discussed the possible use of Humira.  5. Follow-up in 6 months.  6. If there is new insurance after the first of the year, they should be sure to call the  pediatric call center at [phone] to update the insurance information.  If there are medications that have required prior authorizations in the past, they also need to be sure to request refills earlier than normal to get new prior authorizations completed in time.       If there are any new questions or concerns, I would be glad to help and can be reached through our main office at [phone] or our paging  at [phone].    Ernesto Canela MD         Addendum:  Laboratory Investigations:     Results for orders placed or performed in visit on 12/22/17 (from the past 48 hour(s))   CBC with platelets differential   Result Value Ref Range    WBC 7.5 4.0 - 11.0 10e9/L    RBC Count 4.06 3.8 - 5.2 10e12/L    Hemoglobin 11.6 (L) 11.7 - 15.7 g/dL    Hematocrit 35.1 35.0 - 47.0 %    MCV 87 78 - 100 fl    MCH 28.6 26.5 - 33.0 pg    MCHC 33.0 31.5 - 36.5 g/dL    RDW 13.2 10.0 - 15.0 %    Platelet Count 242 150 - 450 10e9/L    Diff Method Automated Method     % Neutrophils 64.1 %    % Lymphocytes 27.8 %    % Monocytes 7.2 %    % Eosinophils 0.7 %    % Basophils 0.1 %    % Immature Granulocytes 0.1 %    Nucleated RBCs 0 0 /100    Absolute Neutrophil 4.8 1.6 - 8.3 10e9/L    Absolute Lymphocytes 2.1 0.8 - 5.3 10e9/L    Absolute Monocytes 0.5 0.0 - 1.3 10e9/L    Absolute Eosinophils 0.1 0.0 - 0.7 10e9/L    Absolute Basophils 0.0 0.0 - 0.2 10e9/L    Abs Immature Granulocytes 0.0 0 - 0.4 10e9/L    Absolute Nucleated RBC 0.0    Creatinine   Result Value Ref Range    Creatinine 0.65 0.52 - 1.04 mg/dL    GFR Estimate >90 >60 mL/min/1.7m2    GFR Estimate If Black >90 >60 mL/min/1.7m2   CRP inflammation   Result Value Ref Range    CRP Inflammation <2.9 0.0 - 8.0 mg/L   Hepatic panel   Result Value Ref Range    Bilirubin Direct <0.1 0.0 - 0.2 mg/dL    Bilirubin Total 0.3 0.2 - 1.3 mg/dL    Albumin 3.2 (L) 3.4 - 5.0 g/dL    Protein Total 6.9 6.8 - 8.8 g/dL    Alkaline Phosphatase 67 40 - 150 U/L    ALT 18 0 - 50 U/L    AST  13 0 - 45 U/L   Routine UA with microscopic   Result Value Ref Range    Color Urine Yellow     Appearance Urine Slightly Cloudy     Glucose Urine Negative NEG^Negative mg/dL    Bilirubin Urine Negative NEG^Negative    Ketones Urine Negative NEG^Negative mg/dL    Specific Gravity Urine 1.020 1.003 - 1.035    Blood Urine Negative NEG^Negative    pH Urine 7.0 5.0 - 7.0 pH    Protein Albumin Urine 10 (A) NEG^Negative mg/dL    Urobilinogen mg/dL Normal 0.0 - 2.0 mg/dL    Nitrite Urine Negative NEG^Negative    Leukocyte Esterase Urine Negative NEG^Negative    Source Midstream Urine     WBC Urine 2 0 - 2 /HPF    RBC Urine <1 0 - 2 /HPF    Bacteria Urine Many (A) NEG^Negative /HPF    Squamous Epithelial /HPF Urine 6 (H) 0 - 1 /HPF    Mucous Urine Present (A) NEG^Negative /LPF    These results are reassuring.   CC  Patient Care Team:  Xuan Quintana MD as PCP - General (Pediatrics)    Yonis Christine      Copy to patient  Nery Nicolas Thomas  [address] [address]

## 2017-12-22 NOTE — PROGRESS NOTES
Problem list:     Patient Active Problem List    Diagnosis Date Noted     Ankylosing spondylitis of sacral region (H) 02/04/2015     Sacroiliitis, enthesitis, tenosynovitis            Allergies:     Allergies   Allergen Reactions     Seasonal Allergies            Medications:     As of completion of this visit:  Current Outpatient Prescriptions   Medication Sig Dispense Refill     folic acid (FOLVITE) 1 MG tablet Take 2 tablets (2 mg) by mouth daily 180 tablet 3     meloxicam (MOBIC) 15 MG tablet Take 1 tablet (15 mg) by mouth daily 90 tablet 1     Etanercept (ENBREL SURECLICK) 50 MG/ML autoinjector Inject 50 mg Subcutaneous once a week 4 mL 12     loratadine (CLARITIN) 10 MG tablet Take 10 mg by mouth daily       Drospiren-Eth Estrad-Levomefol (BEYAZ) 3-0.02-0.451 MG TABS Take 1 tablet by mouth daily       predniSONE (DELTASONE) 20 MG tablet Take 1 tablet (20 mg) by mouth daily (Patient not taking: Reported on 12/22/2017) 15 tablet 3             Subjective:     Jaye is a 22 year old female who was seen in Pediatric Rheumatology clinic today for follow up.  Jaye was last seen in our clinic on 5/15/2017 and returns today accompanied by her mother.  The encounter diagnosis was Ankylosing spondylitis of sacral region (H).      Jaye did relatively well over the last 7 months as best we can tell.  As usual, she has a tendency to somewhat understate things, but she did not end up coming in over the summer for reinjection of her SI joints because she was doing well enough.  This fall she got ill and missed at least a couple of doses of Enbrel and that is when she particularly started noticed some trouble with her fingers.  She was able to keep up with her work but it did impact her ability to write.  She particularly has noted some swelling of her right fifth finger, but all of her fingers that bothers her to some degree.  Since her last treatment with antibiotics around Thanksgiving time she has been infection  "free.  Comprehensive Review of Systems is otherwise negative.    Information per our standardized questionnaire is as below:  Last Exam  Last Eye Exam: 11/21/16  Last Radiograph : 08/06/14  Self Report  Patient Pain Status: 1.5  Patient Global Assessment Of Disease Activity: 1.5  Score Reported By: Self  Arthritis History  Morning stiffness in the past week: < or equal to 15 min  Has your arthritis stopped from trying any athletic or rigorous activities, or interfaced with your ability to do these activities: No  Have you been limited your ability to do normal daily activities in the past week: No  Did you needed help from other people to do normal activities in the past week: No  Have you used any aids or devices to help you do normal daily activities in the past week: No            Examination:     Blood pressure 105/62, pulse 71, temperature 98.1  F (36.7  C), temperature source Oral, height 5' 2.99\" (160 cm), weight 151 lb 7.3 oz (68.7 kg).    Jaye appears generally well and in good spirits.  Head: Normal head and hair.  Eyes: No scleral injection, pupils normal.  Ears: Normal external structures, tympanic membranes.  Nose: No cartilage deformity, congestion.  Mouth: Normal teeth, gums, tongue, mucosa.  Throat: Normal, without erythema or exudate.  Neck: Normal, without thyromegaly  Nodes: No cervical, supraclavicular, axillary, inguinal adenopathy.  Lungs: Normal effort, clear to ausculation.  Heart: Regular rate and rhythm, S1 and S2, no murmurs; normal peripheral pulses and perfusion.  Abdomen: Soft, non-tender, without hepatomegaly, splenomegaly, or masses.  Skin: No inflammatory lesions, only normal scratches and bruises.  Nails: No pitting, infection.  Neurological: Alert, appropriately interactive, normal cranial nerves, no deficits, normal gait.  Musculoskeletal: No evidence of current synovitis of the cervical spine, TMJ, sternoclavicular, acromioclavicular, glenohumeral, elbow, wrist, lumbar spine, " hip, knee, ankle, or toe joints. No tendonitis or bursitis. No enthesitis.   Axial Skeleton  Sacro-Iliac: R Tender, L Tender (minimal)    Upper Extremity  Thumb MCP: R Swollen  Little PIP: R Swollen           Assessment:     Ankylosing spondylitis with a history of sacroiliitis and enthesitis, and now some new joint complaints.  Given infectious illnesses consider up arthritis, and she had to miss a few doses of Enbrel and not surprised that she has some difficulty.  Her mother made a comment that she seems to young to have problems like this with her fingers but I clarified that this is not an age-related phenomenon.  In addition, I clarified for Jaye that the swelling in her finger joints should go back to normal if she is on effective therapy and there should be nothing visible.    Change Since Last Visit: Same  ACR Functional Class: Normal  Provider Global Assessment Of Disease Activity: Inactive (0)  (This is measured on the scale of 0 - 10)  On Medication For Treatment Of AISHWARYA?: Yes  Normal ESR: Normal, or abnormality not due to AISHWARYA  Normal CRP: Normal, or abnormality not due to AISHWARYA  BRANDON Status: Negative  Rheumatoid Factor Status: Negative  HLA-B27 Status: Negative  In Compliance With Screening Interval For AISHWARYA: Yes         Plan:     1. Laboratory monitoring every 3-4 months to monitor medications and disease activity.  2. No imaging is needed today.  3. Continue eye examinations for uveitis monitoring every 12 months..  4. Medications as listed, with the option of taking prednisone 10 or 20 mg daily until things settle down or the next Enbrel dose can be given.  We discussed a new form of Enbrel that she could try if interested.  If things do not settle down with this plan we discussed the possible use of Humira.  5. Follow-up in 6 months.  6. If there is new insurance after the first of the year, they should be sure to call the pediatric call center at [phone] to update the insurance information.  If  there are medications that have required prior authorizations in the past, they also need to be sure to request refills earlier than normal to get new prior authorizations completed in time.       If there are any new questions or concerns, I would be glad to help and can be reached through our main office at [phone] or our paging  at [phone].    Ernesto Canela MD         Addendum:  Laboratory Investigations:     Results for orders placed or performed in visit on 12/22/17 (from the past 48 hour(s))   CBC with platelets differential   Result Value Ref Range    WBC 7.5 4.0 - 11.0 10e9/L    RBC Count 4.06 3.8 - 5.2 10e12/L    Hemoglobin 11.6 (L) 11.7 - 15.7 g/dL    Hematocrit 35.1 35.0 - 47.0 %    MCV 87 78 - 100 fl    MCH 28.6 26.5 - 33.0 pg    MCHC 33.0 31.5 - 36.5 g/dL    RDW 13.2 10.0 - 15.0 %    Platelet Count 242 150 - 450 10e9/L    Diff Method Automated Method     % Neutrophils 64.1 %    % Lymphocytes 27.8 %    % Monocytes 7.2 %    % Eosinophils 0.7 %    % Basophils 0.1 %    % Immature Granulocytes 0.1 %    Nucleated RBCs 0 0 /100    Absolute Neutrophil 4.8 1.6 - 8.3 10e9/L    Absolute Lymphocytes 2.1 0.8 - 5.3 10e9/L    Absolute Monocytes 0.5 0.0 - 1.3 10e9/L    Absolute Eosinophils 0.1 0.0 - 0.7 10e9/L    Absolute Basophils 0.0 0.0 - 0.2 10e9/L    Abs Immature Granulocytes 0.0 0 - 0.4 10e9/L    Absolute Nucleated RBC 0.0    Creatinine   Result Value Ref Range    Creatinine 0.65 0.52 - 1.04 mg/dL    GFR Estimate >90 >60 mL/min/1.7m2    GFR Estimate If Black >90 >60 mL/min/1.7m2   CRP inflammation   Result Value Ref Range    CRP Inflammation <2.9 0.0 - 8.0 mg/L   Hepatic panel   Result Value Ref Range    Bilirubin Direct <0.1 0.0 - 0.2 mg/dL    Bilirubin Total 0.3 0.2 - 1.3 mg/dL    Albumin 3.2 (L) 3.4 - 5.0 g/dL    Protein Total 6.9 6.8 - 8.8 g/dL    Alkaline Phosphatase 67 40 - 150 U/L    ALT 18 0 - 50 U/L    AST 13 0 - 45 U/L   Routine UA with microscopic   Result Value Ref Range    Color  Urine Yellow     Appearance Urine Slightly Cloudy     Glucose Urine Negative NEG^Negative mg/dL    Bilirubin Urine Negative NEG^Negative    Ketones Urine Negative NEG^Negative mg/dL    Specific Gravity Urine 1.020 1.003 - 1.035    Blood Urine Negative NEG^Negative    pH Urine 7.0 5.0 - 7.0 pH    Protein Albumin Urine 10 (A) NEG^Negative mg/dL    Urobilinogen mg/dL Normal 0.0 - 2.0 mg/dL    Nitrite Urine Negative NEG^Negative    Leukocyte Esterase Urine Negative NEG^Negative    Source Midstream Urine     WBC Urine 2 0 - 2 /HPF    RBC Urine <1 0 - 2 /HPF    Bacteria Urine Many (A) NEG^Negative /HPF    Squamous Epithelial /HPF Urine 6 (H) 0 - 1 /HPF    Mucous Urine Present (A) NEG^Negative /LPF    These results are reassuring.   CC  Patient Care Team:  Xuan Quintana MD as PCP - General (Pediatrics)  rEnesto Canela MD as MD (Pediatrics)  Xuan Quintana MD as Referring Physician (Pediatrics)  Yonis Christine TRACIE F    Copy to patient  Nery Nicolas Thomas  [address]  [facility] [address]

## 2017-12-22 NOTE — LETTER
2017    Xuan Quintana MD  [facility]  [address]  [place] [address]    Dear Dr. Quintana,  I am writing to report lab results on your patient.     Patient: Jaye Nicolas  :    1995  MRN:      [id number]    The results include:    Resulted Orders   CBC with platelets differential   Result Value Ref Range    WBC 7.5 4.0 - 11.0 10e9/L    RBC Count 4.06 3.8 - 5.2 10e12/L    Hemoglobin 11.6 (L) 11.7 - 15.7 g/dL    Hematocrit 35.1 35.0 - 47.0 %    MCV 87 78 - 100 fl    MCH 28.6 26.5 - 33.0 pg    MCHC 33.0 31.5 - 36.5 g/dL    RDW 13.2 10.0 - 15.0 %    Platelet Count 242 150 - 450 10e9/L    Diff Method Automated Method     % Neutrophils 64.1 %    % Lymphocytes 27.8 %    % Monocytes 7.2 %    % Eosinophils 0.7 %    % Basophils 0.1 %    % Immature Granulocytes 0.1 %    Nucleated RBCs 0 0 /100    Absolute Neutrophil 4.8 1.6 - 8.3 10e9/L    Absolute Lymphocytes 2.1 0.8 - 5.3 10e9/L    Absolute Monocytes 0.5 0.0 - 1.3 10e9/L    Absolute Eosinophils 0.1 0.0 - 0.7 10e9/L    Absolute Basophils 0.0 0.0 - 0.2 10e9/L    Abs Immature Granulocytes 0.0 0 - 0.4 10e9/L    Absolute Nucleated RBC 0.0    Creatinine   Result Value Ref Range    Creatinine 0.65 0.52 - 1.04 mg/dL    GFR Estimate >90 >60 mL/min/1.7m2      Comment:      Non  GFR Calc    GFR Estimate If Black >90 >60 mL/min/1.7m2      Comment:       GFR Calc   CRP inflammation   Result Value Ref Range    CRP Inflammation <2.9 0.0 - 8.0 mg/L   Hepatic panel   Result Value Ref Range    Bilirubin Direct <0.1 0.0 - 0.2 mg/dL    Bilirubin Total 0.3 0.2 - 1.3 mg/dL    Albumin 3.2 (L) 3.4 - 5.0 g/dL    Protein Total 6.9 6.8 - 8.8 g/dL    Alkaline Phosphatase 67 40 - 150 U/L    ALT 18 0 - 50 U/L    AST 13 0 - 45 U/L   RBC  Sed Rate   Result Value Ref Range    Sed Rate 9 0 - 20 mm/h   Routine UA with microscopic   Result Value Ref Range    Color Urine Yellow     Appearance Urine Slightly Cloudy     Glucose  Urine Negative NEG^Negative mg/dL    Bilirubin Urine Negative NEG^Negative    Ketones Urine Negative NEG^Negative mg/dL    Specific Gravity Urine 1.020 1.003 - 1.035    Blood Urine Negative NEG^Negative    pH Urine 7.0 5.0 - 7.0 pH    Protein Albumin Urine 10 (A) NEG^Negative mg/dL    Urobilinogen mg/dL Normal 0.0 - 2.0 mg/dL    Nitrite Urine Negative NEG^Negative    Leukocyte Esterase Urine Negative NEG^Negative    Source Midstream Urine     WBC Urine 2 0 - 2 /HPF    RBC Urine <1 0 - 2 /HPF    Bacteria Urine Many (A) NEG^Negative /HPF    Squamous Epithelial /HPF Urine 6 (H) 0 - 1 /HPF    Mucous Urine Present (A) NEG^Negative /LPF       These results are reassuring.  Testing should be repeated in 3-4 months.   Please feel free to contact me with any questions or concerns you might have.    Sincerely yours,    Ernesto Canela MD     CC  Patient Care Team:  Xuan Quintana MD as PCP - General (Pediatrics)  Ernesto Canela MD as MD (Pediatrics)  Xuan Quintana MD as Referring Physician (Pediatrics)  Yonis Christine  [address]  [place] [address]

## 2017-12-22 NOTE — NURSING NOTE
"Chief Complaint   Patient presents with     Follow Up For     Arthritis     /62 (BP Location: Right arm, Patient Position: Sitting, Cuff Size: Adult Regular)  Pulse 71  Temp 98.1  F (36.7  C) (Oral)  Ht 5' 2.99\" (160 cm)  Wt 151 lb 7.3 oz (68.7 kg)  BMI 26.84 kg/m2    Radha Hernandez LPN    "

## 2017-12-22 NOTE — PATIENT INSTRUCTIONS
[facility] Pediatric Rheumatology    For Help:  The Pediatric Call Center at [phone] can help with scheduling of routine follow up visits.  Diamond Stewart and Dana Pichardo are the Nurse Coordinators for the Division of Pediatric Rheumatology and can be reached directly at [phone]. They can help with questions about your child s rheumatic condition, medications, and test results.   Please try to schedule infusions 3 months in advance.  Please try to give us 72 hours or longer notice if you need to cancel infusions so other patients can benefit from this opening).  Note: Insurance authorization must be obtained before any infusion can be scheduled. If you change health insurance, you must notify our office as soon as possible, so that the infusion can be reauthorized.    For emergencies after hours or on the weekends, please call the page  at [phone] and ask to speak to the physician on-call for Pediatric Rheumatology. Please do not use [employer] for urgent requests.  Main  Services:  [phone]  o Hmong/Daryl/Malagasy: [phone]  o Mauritian: [phone]  o Polish: [phone]

## 2018-01-11 ENCOUNTER — TELEPHONE (OUTPATIENT)
Dept: RHEUMATOLOGY | Facility: CLINIC | Age: 23
End: 2018-01-11

## 2018-01-11 DIAGNOSIS — M45.9 ANKYLOSING SPONDYLITIS (H): ICD-10-CM

## 2018-01-11 DIAGNOSIS — M45.8 ANKYLOSING SPONDYLITIS OF SACRAL REGION (H): Primary | ICD-10-CM

## 2018-01-11 NOTE — TELEPHONE ENCOUNTER
PA Initiation    Medication: Etanercept (ENBREL SURECLICK) 50 MG/ML autoinjector- Initiated  Insurance Company:  [employer])  Pharmacy Filling the Rx: [address]  Filling Pharmacy Phone:    Filling Pharmacy Fax:    Start Date: 1/11/2018    Waiting for clinical questions in CMM

## 2018-01-23 NOTE — TELEPHONE ENCOUNTER
Spoke with pharmacy they are not getting paid claim  -showing PA is required    Called insurance they stated this was re-opened and does require a PA  -they stated they faxed form to us yesterday but I do not see this form yet on file  -insurance will refax to [phone]    Once receive will complete form and send back to insurance     Spoke with mom to inform   -she is concerned patient will run out of medication soon  -she will also call insurance to inform this is urgent     Will call mom back once form is sent back to insurance

## 2018-01-23 NOTE — TELEPHONE ENCOUNTER
Prior Authorization Approval    Authorization Effective Date: 1/23/2018  Authorization Expiration Date: 1/23/2020  Medication: Etanercept (ENBREL SURECLICK) 50 MG/ML autoinjector- Approved  Approved Dose/Quantity: 50mg / 4  Reference #: [id number]   Insurance Company: [employer] - Phone [phone] Fax [phone]  Expected CoPay:       CoPay Card Available:      Foundation Assistance Needed:    Which Pharmacy is filling the prescription (Not needed for infusion/clinic administered): [address]  Pharmacy Notified: Yes  Patient Notified: Yes    Insurance will fax approval letter

## 2018-02-20 DIAGNOSIS — M46.1 SACROILIITIS (H): ICD-10-CM

## 2018-02-20 DIAGNOSIS — M77.9 ENTHESITIS: ICD-10-CM

## 2018-02-20 DIAGNOSIS — M65.90 TENOSYNOVITIS: ICD-10-CM

## 2018-02-20 RX ORDER — MELOXICAM 15 MG/1
15 TABLET ORAL DAILY
Qty: 90 TABLET | Refills: 1 | Status: SHIPPED | OUTPATIENT
Start: 2018-02-20 | End: 2018-12-07

## 2018-05-18 ENCOUNTER — OFFICE VISIT (OUTPATIENT)
Dept: RHEUMATOLOGY | Facility: CLINIC | Age: 23
End: 2018-05-18
Attending: PEDIATRICS
Payer: COMMERCIAL

## 2018-05-18 VITALS
TEMPERATURE: 97.5 F | SYSTOLIC BLOOD PRESSURE: 115 MMHG | WEIGHT: 151.46 LBS | BODY MASS INDEX: 26.84 KG/M2 | DIASTOLIC BLOOD PRESSURE: 71 MMHG | HEIGHT: 63 IN | HEART RATE: 77 BPM

## 2018-05-18 DIAGNOSIS — M45.8 ANKYLOSING SPONDYLITIS OF SACRAL REGION (H): Primary | ICD-10-CM

## 2018-05-18 DIAGNOSIS — Z79.620 ON ETANERCEPT THERAPY: ICD-10-CM

## 2018-05-18 LAB
ALBUMIN UR-MCNC: NEGATIVE MG/DL
ALT SERPL W P-5'-P-CCNC: 15 U/L (ref 0–50)
APPEARANCE UR: CLEAR
BACTERIA #/AREA URNS HPF: ABNORMAL /HPF
BASOPHILS # BLD AUTO: 0 10E9/L (ref 0–0.2)
BASOPHILS NFR BLD AUTO: 0.1 %
BILIRUB UR QL STRIP: NEGATIVE
COLOR UR AUTO: ABNORMAL
CREAT SERPL-MCNC: 0.66 MG/DL (ref 0.52–1.04)
CRP SERPL-MCNC: 6.7 MG/L (ref 0–8)
DIFFERENTIAL METHOD BLD: NORMAL
EOSINOPHIL # BLD AUTO: 0.1 10E9/L (ref 0–0.7)
EOSINOPHIL NFR BLD AUTO: 0.9 %
ERYTHROCYTE [DISTWIDTH] IN BLOOD BY AUTOMATED COUNT: 13 % (ref 10–15)
ERYTHROCYTE [SEDIMENTATION RATE] IN BLOOD BY WESTERGREN METHOD: 9 MM/H (ref 0–20)
GFR SERPL CREATININE-BSD FRML MDRD: >90 ML/MIN/1.7M2
GLUCOSE UR STRIP-MCNC: NEGATIVE MG/DL
HCT VFR BLD AUTO: 38.2 % (ref 35–47)
HGB BLD-MCNC: 12.2 G/DL (ref 11.7–15.7)
HGB UR QL STRIP: NEGATIVE
IMM GRANULOCYTES # BLD: 0 10E9/L (ref 0–0.4)
IMM GRANULOCYTES NFR BLD: 0.1 %
KETONES UR STRIP-MCNC: NEGATIVE MG/DL
LEUKOCYTE ESTERASE UR QL STRIP: ABNORMAL
LYMPHOCYTES # BLD AUTO: 2.1 10E9/L (ref 0.8–5.3)
LYMPHOCYTES NFR BLD AUTO: 31.2 %
MCH RBC QN AUTO: 27.9 PG (ref 26.5–33)
MCHC RBC AUTO-ENTMCNC: 31.9 G/DL (ref 31.5–36.5)
MCV RBC AUTO: 87 FL (ref 78–100)
MONOCYTES # BLD AUTO: 0.4 10E9/L (ref 0–1.3)
MONOCYTES NFR BLD AUTO: 6.5 %
MUCOUS THREADS #/AREA URNS LPF: PRESENT /LPF
NEUTROPHILS # BLD AUTO: 4.2 10E9/L (ref 1.6–8.3)
NEUTROPHILS NFR BLD AUTO: 61.2 %
NITRATE UR QL: NEGATIVE
NRBC # BLD AUTO: 0 10*3/UL
NRBC BLD AUTO-RTO: 0 /100
PH UR STRIP: 5 PH (ref 5–7)
PLATELET # BLD AUTO: 244 10E9/L (ref 150–450)
RBC # BLD AUTO: 4.37 10E12/L (ref 3.8–5.2)
RBC #/AREA URNS AUTO: 3 /HPF (ref 0–2)
SOURCE: ABNORMAL
SP GR UR STRIP: 1.02 (ref 1–1.03)
SQUAMOUS #/AREA URNS AUTO: 4 /HPF (ref 0–1)
UROBILINOGEN UR STRIP-MCNC: NORMAL MG/DL (ref 0–2)
WBC # BLD AUTO: 6.8 10E9/L (ref 4–11)
WBC #/AREA URNS AUTO: 4 /HPF (ref 0–5)

## 2018-05-18 PROCEDURE — G0463 HOSPITAL OUTPT CLINIC VISIT: HCPCS | Mod: ZF

## 2018-05-18 PROCEDURE — 86480 TB TEST CELL IMMUN MEASURE: CPT | Performed by: PEDIATRICS

## 2018-05-18 PROCEDURE — 86140 C-REACTIVE PROTEIN: CPT | Performed by: PEDIATRICS

## 2018-05-18 PROCEDURE — 81001 URINALYSIS AUTO W/SCOPE: CPT | Performed by: PEDIATRICS

## 2018-05-18 PROCEDURE — 82565 ASSAY OF CREATININE: CPT | Performed by: PEDIATRICS

## 2018-05-18 PROCEDURE — 85652 RBC SED RATE AUTOMATED: CPT | Performed by: PEDIATRICS

## 2018-05-18 PROCEDURE — 36415 COLL VENOUS BLD VENIPUNCTURE: CPT | Performed by: PEDIATRICS

## 2018-05-18 PROCEDURE — 86225 DNA ANTIBODY NATIVE: CPT | Performed by: PEDIATRICS

## 2018-05-18 PROCEDURE — 84460 ALANINE AMINO (ALT) (SGPT): CPT | Performed by: PEDIATRICS

## 2018-05-18 PROCEDURE — 85025 COMPLETE CBC W/AUTO DIFF WBC: CPT | Performed by: PEDIATRICS

## 2018-05-18 RX ORDER — HYDROXYCHLOROQUINE SULFATE 200 MG/1
200 TABLET, FILM COATED ORAL DAILY
Qty: 30 TABLET | Refills: 3 | Status: SHIPPED | OUTPATIENT
Start: 2018-05-18 | End: 2019-01-04

## 2018-05-18 ASSESSMENT — PAIN SCALES - GENERAL: PAINLEVEL: NO PAIN (0)

## 2018-05-18 NOTE — MR AVS SNAPSHOT
After Visit Summary   5/18/2018    Jaye Nicolas    MRN: [id number]           Patient Information     Date Of Birth          1995        Visit Information        Provider Department      5/18/2018 8:40 AM Ernesto Canela MD Peds Rheumatology        Today's Diagnoses     Ankylosing spondylitis of sacral region (H)    -  1    On etanercept therapy          Care Instructions      Try Plaquenil.  If no benefit before you start school, ok to stop.    [facility] Pediatric Rheumatology    For Help:  The Pediatric Call Center at [phone] can help with scheduling of routine follow up visits.  Diamond Stewart and Dana Pichardo are the Nurse Coordinators for the Division of Pediatric Rheumatology and can be reached directly at [phone]. They can help with questions about your child s rheumatic condition, medications, and test results.   Please try to schedule infusions 3 months in advance.  Please try to give us 72 hours or longer notice if you need to cancel infusions so other patients can benefit from this opening).  Note: Insurance authorization must be obtained before any infusion can be scheduled. If you change health insurance, you must notify our office as soon as possible, so that the infusion can be reauthorized.    For emergencies after hours or on the weekends, please call the page  at [phone] and ask to speak to the physician on-call for Pediatric Rheumatology. Please do not use [employer] for urgent requests.  Main  Services:  [phone]  o Hmong/Australian/Ethiopian: [phone]  o Cambodian: [phone]  o Italian: [phone]            Follow-ups after your visit        Follow-up notes from your care team     Return in about 6 months (around 11/18/2018) for Routine Visit.      Who to contact     Please call your clinic at [phone] to:    Ask questions about your health    Make or cancel appointments    Discuss your medicines    Learn about  "your test results    Speak to your doctor            Additional Information About Your Visit        [employer] Information     [employer] gives you secure access to your electronic health record. If you see a primary care provider, you can also send messages to your care team and make appointments. If you have questions, please call your primary care clinic.  If you do not have a primary care provider, please call [phone] and they will assist you.      [employer] is an electronic gateway that provides easy, online access to your medical records. With [employer], you can request a clinic appointment, read your test results, renew a prescription or communicate with your care team.     To access your existing account, please contact your [facility] or call [phone] for assistance.        Care EveryWhere ID     This is your Care EveryWhere ID. This could be used by other organizations to access your [employer] medical records  [id number]        Your Vitals Were     Pulse Temperature Height BMI (Body Mass Index)          77 97.5  F (36.4  C) (Oral) 5' 3.39\" (161 cm) 26.5 kg/m2         Blood Pressure from Last 3 Encounters:   05/18/18 115/71   12/22/17 105/62   05/15/17 109/75    Weight from Last 3 Encounters:   05/18/18 151 lb 7.3 oz (68.7 kg)   12/22/17 151 lb 7.3 oz (68.7 kg)   05/15/17 153 lb 3.5 oz (69.5 kg) (83 %)*     * Growth percentiles are based on CDC 2-20 Years data.              We Performed the Following     ALT     CBC with platelets differential     Creatinine     CRP inflammation     DNA double stranded antibodies     M Tuberculosis by Quantiferon     RBC  Sed Rate     Routine UA with microscopic          Today's Medication Changes          These changes are accurate as of 5/18/18  9:15 AM.  If you have any questions, ask your nurse or doctor.               Start taking these medicines.        Dose/Directions    hydroxychloroquine 200 MG tablet   Commonly known as:  PLAQUENIL "   Used for:  Ankylosing spondylitis of sacral region (H)   Started by:  Ernesto Canela MD        Dose:  200 mg   Take 1 tablet (200 mg) by mouth daily   Quantity:  30 tablet   Refills:  3         These medicines have changed or have updated prescriptions.        Dose/Directions    predniSONE 20 MG tablet   Commonly known as:  DELTASONE   This may have changed:    - when to take this  - reasons to take this   Used for:  Ankylosing spondylitis of sacral region (H)        Dose:  20 mg   Take 1 tablet (20 mg) by mouth daily   Quantity:  15 tablet   Refills:  3            Where to get your medicines      [id number] - [place], [place] - [address] AT [facility] OF [facility] [address] [facility]  [address], [facility] [address]     Phone:  [phone]     hydroxychloroquine 200 MG tablet                Primary Care Provider Office Phone # Fax #    Xuan Quintana [phone]       [facility] [address] [address]  [facility] [address]        Equal Access to Services     [facility] AH: Hadii aad ku hadasho Soomaali, waaxda luqadaha, qaybta kaalmada adeegyamonica, kate calixto . So [facility] [phone].    ATENCIÓN: Si habla español, tiene a balderrama disposición servicios gratuitos de asistencia lingüística. [facility] [phone].    We comply with applicable federal civil rights laws and [place] laws. We do not discriminate on the basis of race, color, national origin, age, disability, sex, sexual orientation, or gender identity.            Thank you!     [facility]. Our goal is always to provide you with excellent care. Hearing back from our patients is one way we can continue to improve our services. Please take a few minutes to complete the written survey that you may receive in the mail after your visit with us. Thank you!             Your Updated Medication List - Protect others  around you: Learn how to safely use, store and throw away your medicines at www.disposemymeds.org.          This list is accurate as of 5/18/18  9:15 AM.  Always use your most recent med list.                   Brand Name Dispense Instructions for use Diagnosis    BEYAZ 3-0.02-0.451 MG Tabs   Generic drug:  Drospiren-Eth Estrad-Levomefol      Take 1 tablet by mouth daily        Etanercept 50 MG/ML autoinjector    ENBREL SURECLICK    4 mL    Inject 50 mg Subcutaneous once a week    Ankylosing spondylitis of sacral region (H)       folic acid 1 MG tablet    FOLVITE    180 tablet    Take 2 tablets (2 mg) by mouth daily    Ankylosing spondylitis of sacral region (H)       hydroxychloroquine 200 MG tablet    PLAQUENIL    30 tablet    Take 1 tablet (200 mg) by mouth daily    Ankylosing spondylitis of sacral region (H)       loratadine 10 MG tablet    CLARITIN     Take 10 mg by mouth daily    Ankylosing spondylitis (H)       meloxicam 15 MG tablet    MOBIC    90 tablet    Take 1 tablet (15 mg) by mouth daily    Tenosynovitis, Sacroiliitis (H), Enthesitis       predniSONE 20 MG tablet    DELTASONE    15 tablet    Take 1 tablet (20 mg) by mouth daily    Ankylosing spondylitis of sacral region (H)

## 2018-05-18 NOTE — LETTER
5/18/2018      RE: Jaye Nicolas  [address]  [place] [address]           Problem list:     Patient Active Problem List    Diagnosis Date Noted     Ankylosing spondylitis of sacral region (H) 02/04/2015     Sacroiliitis, enthesitis, tenosynovitis            Allergies:     Allergies   Allergen Reactions     Seasonal Allergies            Medications:     As of completion of this visit:  Current Outpatient Prescriptions   Medication Sig Dispense Refill     Drospiren-Eth Estrad-Levomefol (BEYAZ) 3-0.02-0.451 MG TABS Take 1 tablet by mouth daily       Etanercept (ENBREL SURECLICK) 50 MG/ML autoinjector Inject 50 mg Subcutaneous once a week 4 mL 12     folic acid (FOLVITE) 1 MG tablet Take 2 tablets (2 mg) by mouth daily 180 tablet 3     hydroxychloroquine (PLAQUENIL) 200 MG tablet Take 1 tablet (200 mg) by mouth daily 30 tablet 3     loratadine (CLARITIN) 10 MG tablet Take 10 mg by mouth daily       meloxicam (MOBIC) 15 MG tablet Take 1 tablet (15 mg) by mouth daily 90 tablet 1     predniSONE (DELTASONE) 20 MG tablet Take 1 tablet (20 mg) by mouth daily (Patient taking differently: Take 20 mg by mouth as needed ) 15 tablet 3             Subjective:     Jaye is a 22 year old female who was seen in Pediatric Rheumatology clinic today for follow up.  Jaye was last seen in our clinic on 12/22/2017 and returns today accompanied by her mother.  The primary encounter diagnosis was Ankylosing spondylitis of sacral region (H). A diagnosis of On etanercept therapy was also pertinent to this visit.      Jaye reports that things went well this past semester except for ongoing stiffness with her fingers.  The right little finger particularly is bothersome to her and she feels like it is swollen.  Her morning stiffness is under 30 minutes generally and she does not have any major functional limitations.  Her mother has a question regarding whether the shingles vaccine would be a good idea for her because of her use of  "Enbrel, and I am not aware of any advice for younger individuals like herself to get this vaccine for this reason.    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     Comprehensive Review of Systems is otherwise negative for any new health concerns today.    Information per our standardized questionnaire is as below:  Last Exam  Last Eye Exam: 11/23/17  Last Radiograph : 08/06/14  Self Report  Patient Pain Status: 1.5  Patient Global Assessment Of Disease Activity: 1.5  Score Reported By: Self  Arthritis History  Morning stiffness in the past week: None  Has your arthritis stopped from trying any athletic or rigorous activities, or interfaced with your ability to do these activities: No  Have you been limited your ability to do normal daily activities in the past week: No  Did you needed help from other people to do normal activities in the past week: No  Have you used any aids or devices to help you do normal daily activities in the past week: No            Examination:     Blood pressure 115/71, pulse 77, temperature 97.5  F (36.4  C), temperature source Oral, height 5' 3.39\" (161 cm), weight 151 lb 7.3 oz (68.7 kg).    Jaye appears generally well and in good spirits.  Head: Normal head and hair.  Eyes: No scleral injection, pupils normal.  Ears: Normal external structures, tympanic membranes.  Nose: No cartilage deformity, congestion.  Mouth: Normal teeth, gums, tongue, mucosa.  Throat: Normal, without erythema or exudate.  Neck: Normal, without thyromegaly  Nodes: No cervical, supraclavicular, axillary, inguinal adenopathy.  Lungs: Normal effort, clear to ausculation.  Heart: Regular rate and rhythm, S1 and S2, no murmurs; normal peripheral pulses and perfusion.  Abdomen: Soft, non-tender, without hepatomegaly, splenomegaly, or masses.  Skin: No inflammatory " lesions, only normal scratches and bruises.  Nails: No pitting, infection.  Neurological: Alert, appropriately interactive, normal cranial nerves, no deficits, normal gait.  Musculoskeletal: No evidence of current synovitis of the cervical spine, TMJ, sternoclavicular, acromioclavicular, glenohumeral, elbow, wrist, finger, lumbar spine, sacroiliac, hip, knee, ankle, or toe joints. No tendonitis or bursitis. No enthesitis.  She is generally hypermobile, especially in the fingers.  There may be a small angular deformity of the right little finger PIP joint but I cannot appreciate any capsular distention from fluid or synovial thickening.  There is no loss of range of motion in this joint to indicate active synovitis or significant past synovitis.           Assessment:     Spondyloarthropathy (ankylosing spondylitis), with ongoing hand symptoms.  We see this not uncommonly in our patients with spondyloarthropathy, but often this is the only problem, without evolution of deformity or damage.  In her case I am not sure that she has any inflammatory changes due to enthesitis or arthritis of her hands.  To sort this out we could consider Doppler ultrasound or MRI of the right little finger PIP joint.  If there is a question as to whether we are dealing with mechanical problem or an inflammatory problem probably the quickest way to sort this out would be to do a prednisone burst.    However my leaning is that this is predominantly mechanical in nature.  I have other patients who have benefited from seeing hand orthopedics or occupational therapist who specializes in hypermobility.  The best option if she needs additional anti-inflammatory therapy, given that she has been on methotrexate and sulfasalazine without real benefit in the past, would be to consider hydroxychloroquine.  Failing that, if there is a suggestion that it is ongoing inflammation and I would try a different TNF inhibitor.      Change Since Last Visit:  Somewhat Better  ACR Functional Class: Normal  Provider Global Assessment Of Disease Activity: Inactive (0)  (This is measured on the scale of 0 - 10)  On Medication For Treatment Of AISHWARYA?: Yes  Normal ESR: Normal, or abnormality not due to AISHWARYA  Normal CRP: Normal, or abnormality not due to AISHWARYA  BRANDON Status: Negative  Rheumatoid Factor Status: Negative  HLA-B27 Status: Negative  In Compliance With Screening Interval For AISHWARYA: Yes         Plan:     1. Laboratory monitoring every 4-6 months to monitor medications and disease activity.  2. No imaging is needed today.  3. Consider eye examinations for uveitis monitoring every 12 months.  4. Consider seeing hand orthopedics, or occupational therapy.  5. Medications as listed, with the option of adding hydroxychloroquine 200 mg orally once daily.  If this provides no benefit whatsoever over the summer than it can just be stopped.  6. Follow-up in 6 months or sooner if there are new concerns.     If there are any new questions or concerns, I would be glad to help and can be reached through our main office at [phone] or our paging  at [phone].    Ernesto Canela MD           Addendum:  Laboratory Investigations:     Results for orders placed or performed in visit on 05/18/18 (from the past 48 hour(s))   CBC with platelets differential   Result Value Ref Range    WBC 6.8 4.0 - 11.0 10e9/L    RBC Count 4.37 3.8 - 5.2 10e12/L    Hemoglobin 12.2 11.7 - 15.7 g/dL    Hematocrit 38.2 35.0 - 47.0 %    MCV 87 78 - 100 fl    MCH 27.9 26.5 - 33.0 pg    MCHC 31.9 31.5 - 36.5 g/dL    RDW 13.0 10.0 - 15.0 %    Platelet Count 244 150 - 450 10e9/L    Diff Method Automated Method     % Neutrophils 61.2 %    % Lymphocytes 31.2 %    % Monocytes 6.5 %    % Eosinophils 0.9 %    % Basophils 0.1 %    % Immature Granulocytes 0.1 %    Nucleated RBCs 0 0 /100    Absolute Neutrophil 4.2 1.6 - 8.3 10e9/L    Absolute Lymphocytes 2.1 0.8 - 5.3 10e9/L    Absolute Monocytes 0.4 0.0 - 1.3  10e9/L    Absolute Eosinophils 0.1 0.0 - 0.7 10e9/L    Absolute Basophils 0.0 0.0 - 0.2 10e9/L    Abs Immature Granulocytes 0.0 0 - 0.4 10e9/L    Absolute Nucleated RBC 0.0    Creatinine   Result Value Ref Range    Creatinine 0.66 0.52 - 1.04 mg/dL    GFR Estimate >90 >60 mL/min/1.7m2    GFR Estimate If Black >90 >60 mL/min/1.7m2   CRP inflammation   Result Value Ref Range    CRP Inflammation 6.7 0.0 - 8.0 mg/L   RBC  Sed Rate   Result Value Ref Range    Sed Rate 9 0 - 20 mm/h   Routine UA with microscopic   Result Value Ref Range    Color Urine Light Yellow     Appearance Urine Clear     Glucose Urine Negative NEG^Negative mg/dL    Bilirubin Urine Negative NEG^Negative    Ketones Urine Negative NEG^Negative mg/dL    Specific Gravity Urine 1.016 1.003 - 1.035    Blood Urine Negative NEG^Negative    pH Urine 5.0 5.0 - 7.0 pH    Protein Albumin Urine Negative NEG^Negative mg/dL    Urobilinogen mg/dL Normal 0.0 - 2.0 mg/dL    Nitrite Urine Negative NEG^Negative    Leukocyte Esterase Urine Moderate (A) NEG^Negative    Source Midstream Urine     WBC Urine 4 0 - 5 /HPF    RBC Urine 3 (H) 0 - 2 /HPF    Bacteria Urine Many (A) NEG^Negative /HPF    Squamous Epithelial /HPF Urine 4 (H) 0 - 1 /HPF    Mucous Urine Present (A) NEG^Negative /LPF   ALT   Result Value Ref Range    ALT 15 0 - 50 U/L    These results are reassuring.       CC  Patient Care Team:  Xuan Quintana MD as PCP - General (Pediatrics)    Yonis Christine    Copy to patient    Parent(s) of Jaye Pageum  [address] [facility] [place] [address]

## 2018-05-18 NOTE — LETTER
May 27, 2018    Xuan Quintana MD  [facility]  [address]  [place] [address]    Dear Dr. Quintana,    I am writing to report lab results from 2018 on your patient.     Patient: Jaye Nicolas  :    1995  MRN:      [id number]    The results include:    Resulted Orders   CBC with platelets differential   Result Value Ref Range    WBC 6.8 4.0 - 11.0 10e9/L    RBC Count 4.37 3.8 - 5.2 10e12/L    Hemoglobin 12.2 11.7 - 15.7 g/dL    Hematocrit 38.2 35.0 - 47.0 %    MCV 87 78 - 100 fl    MCH 27.9 26.5 - 33.0 pg    MCHC 31.9 31.5 - 36.5 g/dL    RDW 13.0 10.0 - 15.0 %    Platelet Count 244 150 - 450 10e9/L    Diff Method Automated Method     % Neutrophils 61.2 %    % Lymphocytes 31.2 %    % Monocytes 6.5 %    % Eosinophils 0.9 %    % Basophils 0.1 %    % Immature Granulocytes 0.1 %    Nucleated RBCs 0 0 /100    Absolute Neutrophil 4.2 1.6 - 8.3 10e9/L    Absolute Lymphocytes 2.1 0.8 - 5.3 10e9/L    Absolute Monocytes 0.4 0.0 - 1.3 10e9/L    Absolute Eosinophils 0.1 0.0 - 0.7 10e9/L    Absolute Basophils 0.0 0.0 - 0.2 10e9/L    Abs Immature Granulocytes 0.0 0 - 0.4 10e9/L    Absolute Nucleated RBC 0.0    Creatinine   Result Value Ref Range    Creatinine 0.66 0.52 - 1.04 mg/dL    GFR Estimate >90 >60 mL/min/1.7m2      Comment:      Non  GFR Calc    GFR Estimate If Black >90 >60 mL/min/1.7m2      Comment:       GFR Calc   CRP inflammation   Result Value Ref Range    CRP Inflammation 6.7 0.0 - 8.0 mg/L   RBC  Sed Rate   Result Value Ref Range    Sed Rate 9 0 - 20 mm/h   Routine UA with microscopic   Result Value Ref Range    Color Urine Light Yellow     Appearance Urine Clear     Glucose Urine Negative NEG^Negative mg/dL    Bilirubin Urine Negative NEG^Negative    Ketones Urine Negative NEG^Negative mg/dL    Specific Gravity Urine 1.016 1.003 - 1.035    Blood Urine Negative NEG^Negative    pH Urine 5.0 5.0 - 7.0 pH    Protein Albumin Urine Negative  NEG^Negative mg/dL    Urobilinogen mg/dL Normal 0.0 - 2.0 mg/dL    Nitrite Urine Negative NEG^Negative    Leukocyte Esterase Urine Moderate (A) NEG^Negative    Source Midstream Urine     WBC Urine 4 0 - 5 /HPF    RBC Urine 3 (H) 0 - 2 /HPF    Bacteria Urine Many (A) NEG^Negative /HPF    Squamous Epithelial /HPF Urine 4 (H) 0 - 1 /HPF    Mucous Urine Present (A) NEG^Negative /LPF   ALT   Result Value Ref Range    ALT 15 0 - 50 U/L   DNA double stranded antibodies   Result Value Ref Range    DNA-ds 3 <10 IU/mL      Comment:      Negative   M Tuberculosis by Quantiferon   Result Value Ref Range    M Tuberculosis Result Negative NEG^Negative    M Tuberculosis Antigen Value 0.03 IU/mL      Comment:      This is a qualitative test.  The TB antigen IU/mL value is required for   documentation on certain government reporting forms but this value should not   be used to monitor disease progression or response to therapy.  Diagnosing or excluding tuberculosis disease, and assessing the probability of   LTBI, require a combination of epidemiological, historical, medical and   diagnostic findings that should be taken into account when interpreting   QuantiFERON TB results.       These results are reassuring.  Please feel free to contact me with any questions or concerns you might have.    Sincerely yours,    Ernesto Canela MD     CC  Patient Care Team:  Xuan Quintana MD as PCP - General (Pediatrics)  Ernesto Canela MD as MD (Pediatrics)  Xuan Qunitana MD as Referring Physician (Pediatrics)  Yonis Christine  [address] [facility] [address]

## 2018-05-18 NOTE — NURSING NOTE
"Chief Complaint   Patient presents with     Follow Up For     Ankylosing spondylitis of sacral region     /71  Pulse 77  Temp 97.5  F (36.4  C) (Oral)  Ht 5' 3.39\" (161 cm)  Wt 151 lb 7.3 oz (68.7 kg)  BMI 26.5 kg/m2    PT. DECLINED LMX    Birgit Maldonado CMA    "

## 2018-05-18 NOTE — PROGRESS NOTES
Problem list:     Patient Active Problem List    Diagnosis Date Noted     Ankylosing spondylitis of sacral region (H) 02/04/2015     Sacroiliitis, enthesitis, tenosynovitis            Allergies:     Allergies   Allergen Reactions     Seasonal Allergies            Medications:     As of completion of this visit:  Current Outpatient Prescriptions   Medication Sig Dispense Refill     Drospiren-Eth Estrad-Levomefol (BEYAZ) 3-0.02-0.451 MG TABS Take 1 tablet by mouth daily       Etanercept (ENBREL SURECLICK) 50 MG/ML autoinjector Inject 50 mg Subcutaneous once a week 4 mL 12     folic acid (FOLVITE) 1 MG tablet Take 2 tablets (2 mg) by mouth daily 180 tablet 3     hydroxychloroquine (PLAQUENIL) 200 MG tablet Take 1 tablet (200 mg) by mouth daily 30 tablet 3     loratadine (CLARITIN) 10 MG tablet Take 10 mg by mouth daily       meloxicam (MOBIC) 15 MG tablet Take 1 tablet (15 mg) by mouth daily 90 tablet 1     predniSONE (DELTASONE) 20 MG tablet Take 1 tablet (20 mg) by mouth daily (Patient taking differently: Take 20 mg by mouth as needed ) 15 tablet 3             Subjective:     Jaye is a 22 year old female who was seen in Pediatric Rheumatology clinic today for follow up.  Jaye was last seen in our clinic on 12/22/2017 and returns today accompanied by her mother.  The primary encounter diagnosis was Ankylosing spondylitis of sacral region (H). A diagnosis of On etanercept therapy was also pertinent to this visit.      Jaye reports that things went well this past semester except for ongoing stiffness with her fingers.  The right little finger particularly is bothersome to her and she feels like it is swollen.  Her morning stiffness is under 30 minutes generally and she does not have any major functional limitations.  Her mother has a question regarding whether the shingles vaccine would be a good idea for her because of her use of Enbrel, and I am not aware of any advice for younger individuals like herself to  "get this vaccine for this reason.    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     Comprehensive Review of Systems is otherwise negative for any new health concerns today.    Information per our standardized questionnaire is as below:  Last Exam  Last Eye Exam: 11/23/17  Last Radiograph : 08/06/14  Self Report  Patient Pain Status: 1.5  Patient Global Assessment Of Disease Activity: 1.5  Score Reported By: Self  Arthritis History  Morning stiffness in the past week: None  Has your arthritis stopped from trying any athletic or rigorous activities, or interfaced with your ability to do these activities: No  Have you been limited your ability to do normal daily activities in the past week: No  Did you needed help from other people to do normal activities in the past week: No  Have you used any aids or devices to help you do normal daily activities in the past week: No            Examination:     Blood pressure 115/71, pulse 77, temperature 97.5  F (36.4  C), temperature source Oral, height 5' 3.39\" (161 cm), weight 151 lb 7.3 oz (68.7 kg).    Jaye appears generally well and in good spirits.  Head: Normal head and hair.  Eyes: No scleral injection, pupils normal.  Ears: Normal external structures, tympanic membranes.  Nose: No cartilage deformity, congestion.  Mouth: Normal teeth, gums, tongue, mucosa.  Throat: Normal, without erythema or exudate.  Neck: Normal, without thyromegaly  Nodes: No cervical, supraclavicular, axillary, inguinal adenopathy.  Lungs: Normal effort, clear to ausculation.  Heart: Regular rate and rhythm, S1 and S2, no murmurs; normal peripheral pulses and perfusion.  Abdomen: Soft, non-tender, without hepatomegaly, splenomegaly, or masses.  Skin: No inflammatory lesions, only normal scratches and bruises.  Nails: No pitting, " infection.  Neurological: Alert, appropriately interactive, normal cranial nerves, no deficits, normal gait.  Musculoskeletal: No evidence of current synovitis of the cervical spine, TMJ, sternoclavicular, acromioclavicular, glenohumeral, elbow, wrist, finger, lumbar spine, sacroiliac, hip, knee, ankle, or toe joints. No tendonitis or bursitis. No enthesitis.  She is generally hypermobile, especially in the fingers.  There may be a small angular deformity of the right little finger PIP joint but I cannot appreciate any capsular distention from fluid or synovial thickening.  There is no loss of range of motion in this joint to indicate active synovitis or significant past synovitis.           Assessment:     Spondyloarthropathy (ankylosing spondylitis), with ongoing hand symptoms.  We see this not uncommonly in our patients with spondyloarthropathy, but often this is the only problem, without evolution of deformity or damage.  In her case I am not sure that she has any inflammatory changes due to enthesitis or arthritis of her hands.  To sort this out we could consider Doppler ultrasound or MRI of the right little finger PIP joint.  If there is a question as to whether we are dealing with mechanical problem or an inflammatory problem probably the quickest way to sort this out would be to do a prednisone burst.    However my leaning is that this is predominantly mechanical in nature.  I have other patients who have benefited from seeing hand orthopedics or occupational therapist who specializes in hypermobility.  The best option if she needs additional anti-inflammatory therapy, given that she has been on methotrexate and sulfasalazine without real benefit in the past, would be to consider hydroxychloroquine.  Failing that, if there is a suggestion that it is ongoing inflammation and I would try a different TNF inhibitor.      Change Since Last Visit: Somewhat Better  ACR Functional Class: Normal  Provider Global  Assessment Of Disease Activity: Inactive (0)  (This is measured on the scale of 0 - 10)  On Medication For Treatment Of AISHWARYA?: Yes  Normal ESR: Normal, or abnormality not due to AISHWARYA  Normal CRP: Normal, or abnormality not due to AISHWARYA  BRANDON Status: Negative  Rheumatoid Factor Status: Negative  HLA-B27 Status: Negative  In Compliance With Screening Interval For AISHWARYA: Yes         Plan:     1. Laboratory monitoring every 4-6 months to monitor medications and disease activity.  2. No imaging is needed today.  3. Consider eye examinations for uveitis monitoring every 12 months.  4. Consider seeing hand orthopedics, or occupational therapy.  5. Medications as listed, with the option of adding hydroxychloroquine 200 mg orally once daily.  If this provides no benefit whatsoever over the summer than it can just be stopped.  6. Follow-up in 6 months or sooner if there are new concerns.     If there are any new questions or concerns, I would be glad to help and can be reached through our main office at [phone] or our paging  at [phone].    Ernesto Canela MD           Addendum:  Laboratory Investigations:     Results for orders placed or performed in visit on 05/18/18 (from the past 48 hour(s))   CBC with platelets differential   Result Value Ref Range    WBC 6.8 4.0 - 11.0 10e9/L    RBC Count 4.37 3.8 - 5.2 10e12/L    Hemoglobin 12.2 11.7 - 15.7 g/dL    Hematocrit 38.2 35.0 - 47.0 %    MCV 87 78 - 100 fl    MCH 27.9 26.5 - 33.0 pg    MCHC 31.9 31.5 - 36.5 g/dL    RDW 13.0 10.0 - 15.0 %    Platelet Count 244 150 - 450 10e9/L    Diff Method Automated Method     % Neutrophils 61.2 %    % Lymphocytes 31.2 %    % Monocytes 6.5 %    % Eosinophils 0.9 %    % Basophils 0.1 %    % Immature Granulocytes 0.1 %    Nucleated RBCs 0 0 /100    Absolute Neutrophil 4.2 1.6 - 8.3 10e9/L    Absolute Lymphocytes 2.1 0.8 - 5.3 10e9/L    Absolute Monocytes 0.4 0.0 - 1.3 10e9/L    Absolute Eosinophils 0.1 0.0 - 0.7 10e9/L    Absolute  Basophils 0.0 0.0 - 0.2 10e9/L    Abs Immature Granulocytes 0.0 0 - 0.4 10e9/L    Absolute Nucleated RBC 0.0    Creatinine   Result Value Ref Range    Creatinine 0.66 0.52 - 1.04 mg/dL    GFR Estimate >90 >60 mL/min/1.7m2    GFR Estimate If Black >90 >60 mL/min/1.7m2   CRP inflammation   Result Value Ref Range    CRP Inflammation 6.7 0.0 - 8.0 mg/L   RBC  Sed Rate   Result Value Ref Range    Sed Rate 9 0 - 20 mm/h   Routine UA with microscopic   Result Value Ref Range    Color Urine Light Yellow     Appearance Urine Clear     Glucose Urine Negative NEG^Negative mg/dL    Bilirubin Urine Negative NEG^Negative    Ketones Urine Negative NEG^Negative mg/dL    Specific Gravity Urine 1.016 1.003 - 1.035    Blood Urine Negative NEG^Negative    pH Urine 5.0 5.0 - 7.0 pH    Protein Albumin Urine Negative NEG^Negative mg/dL    Urobilinogen mg/dL Normal 0.0 - 2.0 mg/dL    Nitrite Urine Negative NEG^Negative    Leukocyte Esterase Urine Moderate (A) NEG^Negative    Source Midstream Urine     WBC Urine 4 0 - 5 /HPF    RBC Urine 3 (H) 0 - 2 /HPF    Bacteria Urine Many (A) NEG^Negative /HPF    Squamous Epithelial /HPF Urine 4 (H) 0 - 1 /HPF    Mucous Urine Present (A) NEG^Negative /LPF   ALT   Result Value Ref Range    ALT 15 0 - 50 U/L    These results are reassuring.       CC  Patient Care Team:  Xuan Quintana MD as PCP - General (Pediatrics)  Ernesto Canela MD as MD (Pediatrics)  Xuan Quintana MD as Referring Physician (Pediatrics)  Yonis Christine TRACIE F    Copy to patient  Maria Isabel, Nery Maria Isabel, [place]  [address] [facility] [address]

## 2018-05-21 LAB
M TB TUBERC IFN-G BLD QL: NEGATIVE
M TB TUBERC IFN-G/MITOGEN IGNF BLD: 0.03 IU/ML

## 2018-05-23 ENCOUNTER — TRANSFERRED RECORDS (OUTPATIENT)
Dept: HEALTH INFORMATION MANAGEMENT | Facility: CLINIC | Age: 23
End: 2018-05-23

## 2018-05-24 LAB — DSDNA AB SER-ACNC: 3 IU/ML

## 2018-07-06 ENCOUNTER — TRANSFERRED RECORDS (OUTPATIENT)
Dept: HEALTH INFORMATION MANAGEMENT | Facility: CLINIC | Age: 23
End: 2018-07-06

## 2018-08-06 ENCOUNTER — TRANSFERRED RECORDS (OUTPATIENT)
Dept: HEALTH INFORMATION MANAGEMENT | Facility: CLINIC | Age: 23
End: 2018-08-06

## 2018-08-09 NOTE — TELEPHONE ENCOUNTER
----- Message from Gisele Jaquez MD sent at 8/8/2018  7:13 PM CDT -----  Regarding: RE: referral  Thank you for the update. His hemoglobin level had been dropping from 15 to around 11 over the last 2-3 month which might account for his shortness of breath. Please get PFTs ordered so we can have some workup done by the time I see him.    Thank you    Gisele Jaquez MD  Pulmonary & Critical Care Medicine  Pager: [phone]    ----- Message -----  From: Nette Haynes RN  Sent: 8/8/2018   9:04 AM  To: Farhat Melton MD, Gisele Jaquez MD, #  Subject: RE: referral                                     Dr. Melton entered \"Dyspnea on exertion with negative cardiac work-up.  CTA negative for PE.\" and per patient's wife during phone call yesterday, he is having ongoing, steadily increasing SOB, to the point where he struggled to get up the steps after walking from the car to house.   Thanks so much for your attention to this matter.     ----- Message -----  From: Gisele Jaquez MD  Sent: 8/8/2018   7:28 AM  To: Nette Haynes RN, Bianca Hamilton RN  Subject: RE: referral                                     Will try to get him in sooner. Can you please let me know what is the reason for consult?    Thank you    Gisele Jaquez MD  Pulmonary & Critical Care Medicine  Pager: [phone]    ----- Message -----  From: eNtte Haynes RN  Sent: 8/7/2018   4:42 PM  To: TUAN Jaquez Pulm Nurse Message Pool  Subject: referral                                         Hi there. We placed a referral for patient today. Please see telephone encounter from today for Dr. Melton. Patient currently has appt with another pulmonologist on 09/12, but needs to be seen much sooner if possible.   Thanks. :-)         Prior Authorization Not Needed per Insurance    Medication: Etanercept (ENBREL SURECLICK) 50 MG/ML autoinjector- No PA Needed  Insurance Company:    Expected CoPay:      Pharmacy Filling the Rx: [facility] [address]  Pharmacy Notified: Yes  Patient Notified: Yes

## 2018-08-16 ENCOUNTER — TRANSFERRED RECORDS (OUTPATIENT)
Dept: HEALTH INFORMATION MANAGEMENT | Facility: CLINIC | Age: 23
End: 2018-08-16

## 2018-12-07 DIAGNOSIS — M46.1 SACROILIITIS (H): ICD-10-CM

## 2018-12-07 DIAGNOSIS — M65.90 TENOSYNOVITIS: ICD-10-CM

## 2018-12-07 DIAGNOSIS — M45.8 ANKYLOSING SPONDYLITIS OF SACRAL REGION (H): Primary | ICD-10-CM

## 2018-12-07 DIAGNOSIS — M77.9 ENTHESITIS: ICD-10-CM

## 2018-12-07 RX ORDER — MELOXICAM 15 MG/1
15 TABLET ORAL DAILY
Qty: 90 TABLET | Refills: 0 | Status: SHIPPED | OUTPATIENT
Start: 2018-12-07 | End: 2019-03-05

## 2018-12-08 ENCOUNTER — MYC REFILL (OUTPATIENT)
Dept: RHEUMATOLOGY | Facility: CLINIC | Age: 23
End: 2018-12-08

## 2018-12-08 DIAGNOSIS — M45.8 ANKYLOSING SPONDYLITIS OF SACRAL REGION (H): ICD-10-CM

## 2018-12-10 RX ORDER — PREDNISONE 20 MG/1
20 TABLET ORAL DAILY
Qty: 15 TABLET | Refills: 3 | Status: SHIPPED | OUTPATIENT
Start: 2018-12-10 | End: 2019-01-11

## 2018-12-10 RX ORDER — FOLIC ACID 1 MG/1
2 TABLET ORAL DAILY
Qty: 180 TABLET | Refills: 3 | Status: SHIPPED | OUTPATIENT
Start: 2018-12-10 | End: 2020-01-07

## 2019-01-04 ENCOUNTER — OFFICE VISIT (OUTPATIENT)
Dept: RHEUMATOLOGY | Facility: CLINIC | Age: 24
End: 2019-01-04
Attending: PEDIATRICS
Payer: COMMERCIAL

## 2019-01-04 VITALS
WEIGHT: 155.2 LBS | HEIGHT: 64 IN | HEART RATE: 75 BPM | DIASTOLIC BLOOD PRESSURE: 71 MMHG | BODY MASS INDEX: 26.5 KG/M2 | TEMPERATURE: 98.1 F | SYSTOLIC BLOOD PRESSURE: 112 MMHG

## 2019-01-04 DIAGNOSIS — M45.8 ANKYLOSING SPONDYLITIS OF SACRAL REGION (H): Primary | ICD-10-CM

## 2019-01-04 LAB
ALBUMIN UR-MCNC: 10 MG/DL
ALT SERPL W P-5'-P-CCNC: 14 U/L (ref 0–50)
APPEARANCE UR: CLEAR
BACTERIA #/AREA URNS HPF: ABNORMAL /HPF
BASOPHILS # BLD AUTO: 0 10E9/L (ref 0–0.2)
BASOPHILS NFR BLD AUTO: 0.1 %
BILIRUB UR QL STRIP: NEGATIVE
COLOR UR AUTO: YELLOW
CREAT SERPL-MCNC: 0.56 MG/DL (ref 0.52–1.04)
CRP SERPL-MCNC: <2.9 MG/L (ref 0–8)
DIFFERENTIAL METHOD BLD: NORMAL
EOSINOPHIL # BLD AUTO: 0.1 10E9/L (ref 0–0.7)
EOSINOPHIL NFR BLD AUTO: 0.9 %
ERYTHROCYTE [DISTWIDTH] IN BLOOD BY AUTOMATED COUNT: 13.6 % (ref 10–15)
ERYTHROCYTE [SEDIMENTATION RATE] IN BLOOD BY WESTERGREN METHOD: 9 MM/H (ref 0–20)
GFR SERPL CREATININE-BSD FRML MDRD: >90 ML/MIN/{1.73_M2}
GLUCOSE UR STRIP-MCNC: NEGATIVE MG/DL
HCT VFR BLD AUTO: 37.7 % (ref 35–47)
HGB BLD-MCNC: 12.6 G/DL (ref 11.7–15.7)
HGB UR QL STRIP: NEGATIVE
IMM GRANULOCYTES # BLD: 0 10E9/L (ref 0–0.4)
IMM GRANULOCYTES NFR BLD: 0.1 %
KETONES UR STRIP-MCNC: NEGATIVE MG/DL
LEUKOCYTE ESTERASE UR QL STRIP: NEGATIVE
LYMPHOCYTES # BLD AUTO: 2.2 10E9/L (ref 0.8–5.3)
LYMPHOCYTES NFR BLD AUTO: 28.4 %
MCH RBC QN AUTO: 29.2 PG (ref 26.5–33)
MCHC RBC AUTO-ENTMCNC: 33.4 G/DL (ref 31.5–36.5)
MCV RBC AUTO: 88 FL (ref 78–100)
MONOCYTES # BLD AUTO: 0.7 10E9/L (ref 0–1.3)
MONOCYTES NFR BLD AUTO: 8.8 %
MUCOUS THREADS #/AREA URNS LPF: PRESENT /LPF
NEUTROPHILS # BLD AUTO: 4.9 10E9/L (ref 1.6–8.3)
NEUTROPHILS NFR BLD AUTO: 61.7 %
NITRATE UR QL: NEGATIVE
NRBC # BLD AUTO: 0 10*3/UL
NRBC BLD AUTO-RTO: 0 /100
PH UR STRIP: 5.5 PH (ref 5–7)
PLATELET # BLD AUTO: 264 10E9/L (ref 150–450)
RBC # BLD AUTO: 4.31 10E12/L (ref 3.8–5.2)
RBC #/AREA URNS AUTO: <1 /HPF (ref 0–2)
SOURCE: ABNORMAL
SP GR UR STRIP: 1.02 (ref 1–1.03)
SQUAMOUS #/AREA URNS AUTO: 2 /HPF (ref 0–1)
UROBILINOGEN UR STRIP-MCNC: NORMAL MG/DL (ref 0–2)
WBC # BLD AUTO: 7.9 10E9/L (ref 4–11)
WBC #/AREA URNS AUTO: 2 /HPF (ref 0–5)

## 2019-01-04 PROCEDURE — G0463 HOSPITAL OUTPT CLINIC VISIT: HCPCS | Mod: ZF

## 2019-01-04 PROCEDURE — 82565 ASSAY OF CREATININE: CPT | Performed by: PEDIATRICS

## 2019-01-04 PROCEDURE — 81001 URINALYSIS AUTO W/SCOPE: CPT | Performed by: PEDIATRICS

## 2019-01-04 PROCEDURE — 36415 COLL VENOUS BLD VENIPUNCTURE: CPT | Performed by: PEDIATRICS

## 2019-01-04 PROCEDURE — 84460 ALANINE AMINO (ALT) (SGPT): CPT | Performed by: PEDIATRICS

## 2019-01-04 PROCEDURE — 85025 COMPLETE CBC W/AUTO DIFF WBC: CPT | Performed by: PEDIATRICS

## 2019-01-04 PROCEDURE — 85652 RBC SED RATE AUTOMATED: CPT | Performed by: PEDIATRICS

## 2019-01-04 PROCEDURE — 86140 C-REACTIVE PROTEIN: CPT | Performed by: PEDIATRICS

## 2019-01-04 ASSESSMENT — MIFFLIN-ST. JEOR: SCORE: 1446.75

## 2019-01-04 NOTE — PATIENT INSTRUCTIONS
[facility] Pediatric Rheumatology    For Help:  The Pediatric Call Center at [phone] can help with scheduling of routine follow up visits.  Diamond Stewart and Dana Pichardo are the Nurse Coordinators for the Division of Pediatric Rheumatology and can be reached directly at [phone]. They can help with questions about your child s rheumatic condition, medications, and test results.   Please try to schedule infusions 3 months in advance.  Please try to give us 72 hours or longer notice if you need to cancel infusions so other patients can benefit from this opening).  Note: Insurance authorization must be obtained before any infusion can be scheduled. If you change health insurance, you must notify our office as soon as possible, so that the infusion can be reauthorized.    For emergencies after hours or on the weekends, please call the page  at [phone] and ask to speak to the physician on-call for Pediatric Rheumatology. Please do not use GetIntent for urgent requests.  Main  Services:  [phone]  o Hmong/Prydeinig/Faroese: [phone]  o Palauan: [phone]  o [place] citizen: [phone]

## 2019-01-04 NOTE — NURSING NOTE
"Chief Complaint   Patient presents with     Follow Up     Ankylosing spondylitis     Vitals:    01/04/19 0916   BP: 112/71   BP Location: Right arm   Patient Position: Sitting   Cuff Size: Adult Regular   Pulse: 75   Temp: 98.1  F (36.7  C)   TempSrc: Oral   Weight: 155 lb 3.3 oz (70.4 kg)   Height: 5' 3.54\" (161.4 cm)     Radha Hernandez LPN  January 4, 2019  "

## 2019-01-04 NOTE — LETTER
1/4/2019      RE: Jaye Nicolas  [address] [place] [place] [address]           Rheumatology History:   Date of symptom onset:  9/1/2009  Date of first visit to center:  6/11/2014  Date of diagnosis:  6/11/2014     BRANDON Status:  . 1/4/2019   BRANDON Status Negative     RF Status:  . 1/4/2019   Rheumatoid Factor Status Negative     HLA-B27 Status:  . 1/4/2019   HLA-B27 Status Negative           Ophthalmology History:   Iritis/Uveitis Comorbidity:  . 1/4/2019   (COIN) Iritis/Uveitis comorbidity? No     Date of last eye exam: 5/23/2018  In compliance with eye screening (y/n):  Yes         Medications:   As of completion of this visit:  Current Outpatient Medications   Medication Sig Dispense Refill     Drospiren-Eth Estrad-Levomefol (BEYAZ) 3-0.02-0.451 MG TABS Take 1 tablet by mouth daily       Etanercept (ENBREL SURECLICK) 50 MG/ML autoinjector Inject 50 mg Subcutaneous once a week 4 mL 12     folic acid (FOLVITE) 1 MG tablet Take 2 tablets (2 mg) by mouth daily 180 tablet 3     loratadine (CLARITIN) 10 MG tablet Take 10 mg by mouth daily       meloxicam (MOBIC) 15 MG tablet Take 1 tablet (15 mg) by mouth daily 90 tablet 0     predniSONE (DELTASONE) 20 MG tablet Take 20 mg by mouth daily. 15 tablet 3            Allergies:     Allergies   Allergen Reactions     Seasonal Allergies            Problem list:     Patient Active Problem List    Diagnosis Date Noted     Ankylosing spondylitis 02/04/2015     Sacroiliitis, enthesitis, tenosynovitis              Subjective:   Jaye is a 23 year old female who was seen in Pediatric Rheumatology clinic today for follow up.  Jaye was last seen in our clinic on 5/18/2018 and returns today accompanied by her mother.  The encounter diagnosis was Ankylosing spondylitis.      Goals for the visit include follow-up, discussion of new chest symptoms, and planning for semester abroad.  Prescribed medications have been administered regularly, without missed doses, and the medications  have been tolerated well, without side effects.  However she did not start the hydroxychloroquine that was prescribed last time, because after discussion with the ophthalmologist she was nervous about it to use.  We reviewed this information again, and I think the risk for him this represented to her.  She is generally been doing fairly well but occasionally has stiffness of her hands.  Her self-report scores below are highly reassuring.  She will be leaving shortly for a semester abroad in [facility].  There are questions regarding traveling with the medications.  I reviewed with them that clinic note should be sufficient.    Our comprehensive Review of Systems form is negative for any new concerns, but as we discussed things her mother brought up a new problem.  Sometimes there are chest pains laterally and towards the lower sides of the rib margins and may or may not affect her ability to take a deep breath.  This has not been particularly consistent and she cannot give me additional information.  Does not necessarily sound as though it is pleurisy.  There are no known injuries.    Information per our standardized questionnaire is as below:   Self Report  (COIN) Patient Pain Status: 2  (COIN) Patient Global Assessment Of Disease Activity: 2  Score Reported By: Self  (COIN) Patient Highest Level Of Education: some college or technical school  Arthritis History  (COIN) Morning stiffness in the past week: 15 minutes or less  Has your arthritis stopped from trying any athletic or rigorous activities, or interfaced with your ability to do these activities: No  Have you been limited your ability to do normal daily activities in the past week: No  Did you needed help from other people to do normal activities in the past week: No  Have you used any aids or devices to help you do normal daily activities in the past week: No  Important Medical Events  (COIN) Patient has experienced drug-related serious adverse events  "since last encounter?: No         Examination:   Blood pressure 112/71, pulse 75, temperature 98.1  F (36.7  C), temperature source Oral, height 1.614 m (5' 3.54\"), weight 70.4 kg (155 lb 3.3 oz).  Normalized weight-for-age data not available for patients older than 20 years.  Normalized stature-for-age data not available for patients older than 20 years.    Jaye appears generally well and in good spirits.  Head: Normal head and hair.  Eyes: No scleral injection, pupils normal.  Ears: Normal external structures, tympanic membranes.  Nose: No cartilage deformity, congestion.  Mouth: Normal teeth, gums, tongue, mucosa.  Throat: Normal, without erythema or exudate.  Neck: Normal, without thyromegaly  Nodes: No cervical, supraclavicular, axillary, inguinal adenopathy.  Lungs: Normal effort, clear to ausculation.  Heart: Regular rate and rhythm, S1 and S2, no murmurs; normal peripheral pulses and perfusion.  Abdomen: Soft, non-tender, without hepatomegaly, splenomegaly, or masses.  Skin: No inflammatory lesions.  Normal scratches and bruises.  Nails: No pitting, infection.  Neurological: Alert, appropriately interactive, normal cranial nerves, no deficits, normal gait walking.  Musculoskeletal: No evidence of current synovitis of the cervical spine, TMJ, sternoclavicular, acromioclavicular, glenohumeral, elbow, wrist, finger, lumbar spine, hip, knee, ankle, or toe joints. No tendonitis or bursitis.     Axial Skeleton  Sacro-Iliac: R Tender;L Tender  (COIN) Sacroiliac tenderness:: Yes  (COIN) Positive GRACY test:: No  (COIN) Modified Schober's Test:: No    Entheses  (COIN) Tender Entheses count: 0    Total active joints:  0  Total limited joints:  0  Tender entheses count:  0         Assessment:     Ankylosing spondylitis manifesting as sacroiliitis, tenosynovitis and enthesitis.  She is seronegative and HLA-B27 negative.  She has had a good response to treatment with meloxicam and Enbrel.  She was previously on " methotrexate, and that is when she started supplementation with folic acid.  She is no longer on methotrexate and the folic acid could be considered optional.  She has the option of using prednisone for breakthrough problems, and probably should travel with this medicine, but does not need to take it on a daily basis.  It is essential that she stay on her standard therapy while she travels abroad.  I think if she gets laboratory studies today and they are normal then she does not need to do another set of labs while abroad.    I am not sure what to make of these somewhat unclear chest symptoms.  It is certainly possible for an individual with long-standing ankylosing spondylitis to have inflammatory changes of the thoracic structures leading to limited chest expansion or other symptoms, but I would be surprised if this is developing in her.  This is something we also have less experience with in the pediatric setting, so I may need to get at the advice of 1 of the adult providers.  It is possible after she returns from her trip, if the hands are still problem and the ribs are still a problem that we would switch her from Enbrel to Humira, and this might take care of these problems, but there is a long list of other diagnostic considerations and it might be best just to get a good evaluation by pulmonologist to assess whether there is inflammation of the musculoskeletal or soft tissue structures of the chest wall, or perhaps even pleural or parenchymal disease.    Given that she is about to leave the country I really do not want her to start the hydroxychloroquine at this moment.  I think it can be used safely but the chances of having to deal with minor nuisance problems associated with it is probably higher than the potential short or long-term benefit of starting right now.  Again, if she ended up switching to Humira, it might not even be needed.    Change Since Last Visit: Somewhat Better  ACR Functional Class:  Normal  (COIN) Provider Global Assessment Of Disease Activity: 0  (This is measured on the scale of 0 - 10)  (COIN) On Medication For Treatment Of AISHWARYA?: Yes  (COIN) ESR elevated due to AISHWARYA?: No  (COIN) CRP elevated due to AISHWARYA?: No  Health counseling reviewed:          Plan:     1. Laboratory monitoring today and every 4-6 months to monitor medications and disease activity.  2. No imaging is needed today.  3. Continue eye examinations for uveitis monitoring every 12 months.  4. Physical activity as tolerated.  What one can do tells us how well someone is doing, and is healthy for individuals with arthritis.   5. Medications as listed and as commented on.  6. Follow-up in 4-6 months.       If there are any new questions or concerns, I would be glad to help and can be reached through our main office at [phone] or our paging  at [phone].    Ernesto Canela MD           Addendum:  Laboratory Investigations:     Results for orders placed or performed in visit on 01/04/19 (from the past 48 hour(s))   ALT   Result Value Ref Range    ALT 14 0 - 50 U/L   CBC with platelets differential   Result Value Ref Range    WBC 7.9 4.0 - 11.0 10e9/L    RBC Count 4.31 3.8 - 5.2 10e12/L    Hemoglobin 12.6 11.7 - 15.7 g/dL    Hematocrit 37.7 35.0 - 47.0 %    MCV 88 78 - 100 fl    MCH 29.2 26.5 - 33.0 pg    MCHC 33.4 31.5 - 36.5 g/dL    RDW 13.6 10.0 - 15.0 %    Platelet Count 264 150 - 450 10e9/L    Diff Method Automated Method     % Neutrophils 61.7 %    % Lymphocytes 28.4 %    % Monocytes 8.8 %    % Eosinophils 0.9 %    % Basophils 0.1 %    % Immature Granulocytes 0.1 %    Nucleated RBCs 0 0 /100    Absolute Neutrophil 4.9 1.6 - 8.3 10e9/L    Absolute Lymphocytes 2.2 0.8 - 5.3 10e9/L    Absolute Monocytes 0.7 0.0 - 1.3 10e9/L    Absolute Eosinophils 0.1 0.0 - 0.7 10e9/L    Absolute Basophils 0.0 0.0 - 0.2 10e9/L    Abs Immature Granulocytes 0.0 0 - 0.4 10e9/L    Absolute Nucleated RBC 0.0    Creatinine   Result Value  Ref Range    Creatinine 0.56 0.52 - 1.04 mg/dL    GFR Estimate >90 >60 mL/min/[1.73_m2]    GFR Estimate If Black >90 >60 mL/min/[1.73_m2]   CRP inflammation   Result Value Ref Range    CRP Inflammation <2.9 0.0 - 8.0 mg/L   Erythrocyte sedimentation rate auto   Result Value Ref Range    Sed Rate 9 0 - 20 mm/h   UA with Microscopic reflex to Culture   Result Value Ref Range    Color Urine Yellow     Appearance Urine Clear     Glucose Urine Negative NEG^Negative mg/dL    Bilirubin Urine Negative NEG^Negative    Ketones Urine Negative NEG^Negative mg/dL    Specific Gravity Urine 1.023 1.003 - 1.035    Blood Urine Negative NEG^Negative    pH Urine 5.5 5.0 - 7.0 pH    Protein Albumin Urine 10 (A) NEG^Negative mg/dL    Urobilinogen mg/dL Normal 0.0 - 2.0 mg/dL    Nitrite Urine Negative NEG^Negative    Leukocyte Esterase Urine Negative NEG^Negative    Source Midstream Urine     WBC Urine 2 0 - 5 /HPF    RBC Urine <1 0 - 2 /HPF    Bacteria Urine Moderate (A) NEG^Negative /HPF    Squamous Epithelial /HPF Urine 2 (H) 0 - 1 /HPF    Mucous Urine Present (A) NEG^Negative /LPF    These results are reassuring.     Ernesto Canela MD    CC  Patient Care Team:  Xuan Quintana MD as PCP - General (Pediatrics)  Ernesto Canela MD as MD (Pediatrics)  Xuan Quintana MD as Referring Physician (Pediatrics)  Yonis Christine    Copy to patient  Nery Nicolas Thomas  [address]  [facility] [address]

## 2019-01-04 NOTE — PROGRESS NOTES
Rheumatology History:   Date of symptom onset:  9/1/2009  Date of first visit to center:  6/11/2014  Date of diagnosis:  6/11/2014     BRANDON Status:  . 1/4/2019   BRANDON Status Negative     RF Status:  . 1/4/2019   Rheumatoid Factor Status Negative     HLA-B27 Status:  . 1/4/2019   HLA-B27 Status Negative           Ophthalmology History:   Iritis/Uveitis Comorbidity:  . 1/4/2019   (COIN) Iritis/Uveitis comorbidity? No     Date of last eye exam: 5/23/2018  In compliance with eye screening (y/n):  Yes         Medications:   As of completion of this visit:  Current Outpatient Medications   Medication Sig Dispense Refill     Drospiren-Eth Estrad-Levomefol (BEYAZ) 3-0.02-0.451 MG TABS Take 1 tablet by mouth daily       Etanercept (ENBREL SURECLICK) 50 MG/ML autoinjector Inject 50 mg Subcutaneous once a week 4 mL 12     folic acid (FOLVITE) 1 MG tablet Take 2 tablets (2 mg) by mouth daily 180 tablet 3     loratadine (CLARITIN) 10 MG tablet Take 10 mg by mouth daily       meloxicam (MOBIC) 15 MG tablet Take 1 tablet (15 mg) by mouth daily 90 tablet 0     predniSONE (DELTASONE) 20 MG tablet Take 20 mg by mouth daily. 15 tablet 3            Allergies:     Allergies   Allergen Reactions     Seasonal Allergies            Problem list:     Patient Active Problem List    Diagnosis Date Noted     Ankylosing spondylitis 02/04/2015     Sacroiliitis, enthesitis, tenosynovitis              Subjective:   Jaye is a 23 year old female who was seen in Pediatric Rheumatology clinic today for follow up.  Jaye was last seen in our clinic on 5/18/2018 and returns today accompanied by her mother.  The encounter diagnosis was Ankylosing spondylitis.      Goals for the visit include follow-up, discussion of new chest symptoms, and planning for semester abroad.  Prescribed medications have been administered regularly, without missed doses, and the medications have been tolerated well, without side effects.  However she did not start the  hydroxychloroquine that was prescribed last time, because after discussion with the ophthalmologist she was nervous about it to use.  We reviewed this information again, and I think the risk for him this represented to her.  She is generally been doing fairly well but occasionally has stiffness of her hands.  Her self-report scores below are highly reassuring.  She will be leaving shortly for a semester abroad in [facility].  There are questions regarding traveling with the medications.  I reviewed with them that clinic note should be sufficient.    Our comprehensive Review of Systems form is negative for any new concerns, but as we discussed things her mother brought up a new problem.  Sometimes there are chest pains laterally and towards the lower sides of the rib margins and may or may not affect her ability to take a deep breath.  This has not been particularly consistent and she cannot give me additional information.  Does not necessarily sound as though it is pleurisy.  There are no known injuries.    Information per our standardized questionnaire is as below:   Self Report  (COIN) Patient Pain Status: 2  (COIN) Patient Global Assessment Of Disease Activity: 2  Score Reported By: Self  (COIN) Patient Highest Level Of Education: some college or technical school  Arthritis History  (COIN) Morning stiffness in the past week: 15 minutes or less  Has your arthritis stopped from trying any athletic or rigorous activities, or interfaced with your ability to do these activities: No  Have you been limited your ability to do normal daily activities in the past week: No  Did you needed help from other people to do normal activities in the past week: No  Have you used any aids or devices to help you do normal daily activities in the past week: No  Important Medical Events  (COIN) Patient has experienced drug-related serious adverse events since last encounter?: No         Examination:   Blood pressure 112/71, pulse  "75, temperature 98.1  F (36.7  C), temperature source Oral, height 1.614 m (5' 3.54\"), weight 70.4 kg (155 lb 3.3 oz).  Normalized weight-for-age data not available for patients older than 20 years.  Normalized stature-for-age data not available for patients older than 20 years.    Jaye appears generally well and in good spirits.  Head: Normal head and hair.  Eyes: No scleral injection, pupils normal.  Ears: Normal external structures, tympanic membranes.  Nose: No cartilage deformity, congestion.  Mouth: Normal teeth, gums, tongue, mucosa.  Throat: Normal, without erythema or exudate.  Neck: Normal, without thyromegaly  Nodes: No cervical, supraclavicular, axillary, inguinal adenopathy.  Lungs: Normal effort, clear to ausculation.  Heart: Regular rate and rhythm, S1 and S2, no murmurs; normal peripheral pulses and perfusion.  Abdomen: Soft, non-tender, without hepatomegaly, splenomegaly, or masses.  Skin: No inflammatory lesions.  Normal scratches and bruises.  Nails: No pitting, infection.  Neurological: Alert, appropriately interactive, normal cranial nerves, no deficits, normal gait walking.  Musculoskeletal: No evidence of current synovitis of the cervical spine, TMJ, sternoclavicular, acromioclavicular, glenohumeral, elbow, wrist, finger, lumbar spine, hip, knee, ankle, or toe joints. No tendonitis or bursitis.     Axial Skeleton  Sacro-Iliac: R Tender;L Tender  (COIN) Sacroiliac tenderness:: Yes  (COIN) Positive GRACY test:: No  (COIN) Modified Schober's Test:: No    Entheses  (COIN) Tender Entheses count: 0    Total active joints:  0  Total limited joints:  0  Tender entheses count:  0         Assessment:     Ankylosing spondylitis manifesting as sacroiliitis, tenosynovitis and enthesitis.  She is seronegative and HLA-B27 negative.  She has had a good response to treatment with meloxicam and Enbrel.  She was previously on methotrexate, and that is when she started supplementation with folic acid.  She is no " longer on methotrexate and the folic acid could be considered optional.  She has the option of using prednisone for breakthrough problems, and probably should travel with this medicine, but does not need to take it on a daily basis.  It is essential that she stay on her standard therapy while she travels abroad.  I think if she gets laboratory studies today and they are normal then she does not need to do another set of labs while abroad.    I am not sure what to make of these somewhat unclear chest symptoms.  It is certainly possible for an individual with long-standing ankylosing spondylitis to have inflammatory changes of the thoracic structures leading to limited chest expansion or other symptoms, but I would be surprised if this is developing in her.  This is something we also have less experience with in the pediatric setting, so I may need to get at the advice of 1 of the adult providers.  It is possible after she returns from her trip, if the hands are still problem and the ribs are still a problem that we would switch her from Enbrel to Humira, and this might take care of these problems, but there is a long list of other diagnostic considerations and it might be best just to get a good evaluation by pulmonologist to assess whether there is inflammation of the musculoskeletal or soft tissue structures of the chest wall, or perhaps even pleural or parenchymal disease.    Given that she is about to leave the country I really do not want her to start the hydroxychloroquine at this moment.  I think it can be used safely but the chances of having to deal with minor nuisance problems associated with it is probably higher than the potential short or long-term benefit of starting right now.  Again, if she ended up switching to Humira, it might not even be needed.    Change Since Last Visit: Somewhat Better  ACR Functional Class: Normal  (COIN) Provider Global Assessment Of Disease Activity: 0  (This is measured on  the scale of 0 - 10)  (COIN) On Medication For Treatment Of AISHWARYA?: Yes  (COIN) ESR elevated due to AISHWARYA?: No  (COIN) CRP elevated due to AISHWARYA?: No  Health counseling reviewed:          Plan:     1. Laboratory monitoring today and every 4-6 months to monitor medications and disease activity.  2. No imaging is needed today.  3. Continue eye examinations for uveitis monitoring every 12 months.  4. Physical activity as tolerated.  What one can do tells us how well someone is doing, and is healthy for individuals with arthritis.   5. Medications as listed and as commented on.  6. Follow-up in 4-6 months.       If there are any new questions or concerns, I would be glad to help and can be reached through our main office at [phone] or our paging  at [phone].    Ernesto Canela MD           Addendum:  Laboratory Investigations:     Results for orders placed or performed in visit on 01/04/19 (from the past 48 hour(s))   ALT   Result Value Ref Range    ALT 14 0 - 50 U/L   CBC with platelets differential   Result Value Ref Range    WBC 7.9 4.0 - 11.0 10e9/L    RBC Count 4.31 3.8 - 5.2 10e12/L    Hemoglobin 12.6 11.7 - 15.7 g/dL    Hematocrit 37.7 35.0 - 47.0 %    MCV 88 78 - 100 fl    MCH 29.2 26.5 - 33.0 pg    MCHC 33.4 31.5 - 36.5 g/dL    RDW 13.6 10.0 - 15.0 %    Platelet Count 264 150 - 450 10e9/L    Diff Method Automated Method     % Neutrophils 61.7 %    % Lymphocytes 28.4 %    % Monocytes 8.8 %    % Eosinophils 0.9 %    % Basophils 0.1 %    % Immature Granulocytes 0.1 %    Nucleated RBCs 0 0 /100    Absolute Neutrophil 4.9 1.6 - 8.3 10e9/L    Absolute Lymphocytes 2.2 0.8 - 5.3 10e9/L    Absolute Monocytes 0.7 0.0 - 1.3 10e9/L    Absolute Eosinophils 0.1 0.0 - 0.7 10e9/L    Absolute Basophils 0.0 0.0 - 0.2 10e9/L    Abs Immature Granulocytes 0.0 0 - 0.4 10e9/L    Absolute Nucleated RBC 0.0    Creatinine   Result Value Ref Range    Creatinine 0.56 0.52 - 1.04 mg/dL    GFR Estimate >90 >60 mL/min/[1.73_m2]     GFR Estimate If Black >90 >60 mL/min/[1.73_m2]   CRP inflammation   Result Value Ref Range    CRP Inflammation <2.9 0.0 - 8.0 mg/L   Erythrocyte sedimentation rate auto   Result Value Ref Range    Sed Rate 9 0 - 20 mm/h   UA with Microscopic reflex to Culture   Result Value Ref Range    Color Urine Yellow     Appearance Urine Clear     Glucose Urine Negative NEG^Negative mg/dL    Bilirubin Urine Negative NEG^Negative    Ketones Urine Negative NEG^Negative mg/dL    Specific Gravity Urine 1.023 1.003 - 1.035    Blood Urine Negative NEG^Negative    pH Urine 5.5 5.0 - 7.0 pH    Protein Albumin Urine 10 (A) NEG^Negative mg/dL    Urobilinogen mg/dL Normal 0.0 - 2.0 mg/dL    Nitrite Urine Negative NEG^Negative    Leukocyte Esterase Urine Negative NEG^Negative    Source Midstream Urine     WBC Urine 2 0 - 5 /HPF    RBC Urine <1 0 - 2 /HPF    Bacteria Urine Moderate (A) NEG^Negative /HPF    Squamous Epithelial /HPF Urine 2 (H) 0 - 1 /HPF    Mucous Urine Present (A) NEG^Negative /LPF    These results are reassuring.       CC  Patient Care Team:  Xuan Quintana MD as PCP - General (Pediatrics)  Ernesto Canela MD as MD (Pediatrics)  Xuan Quintana MD as Referring Physician (Pediatrics)  Yonis Christine TRACIE F    Copy to patient  Maria Isabel NeryArnold Disla  [address] [facility] [address]

## 2019-01-10 DIAGNOSIS — M45.8 ANKYLOSING SPONDYLITIS OF SACRAL REGION (H): ICD-10-CM

## 2019-01-11 RX ORDER — PREDNISONE 20 MG/1
20 TABLET ORAL DAILY
Qty: 15 TABLET | Refills: 3 | Status: SHIPPED | OUTPATIENT
Start: 2019-01-11 | End: 2019-05-13

## 2019-02-15 ENCOUNTER — HEALTH MAINTENANCE LETTER (OUTPATIENT)
Age: 24
End: 2019-02-15

## 2019-02-28 DIAGNOSIS — R07.89 PAIN, CHEST WALL: ICD-10-CM

## 2019-02-28 DIAGNOSIS — M45.8 ANKYLOSING SPONDYLITIS OF SACRAL REGION (H): Primary | ICD-10-CM

## 2019-03-05 DIAGNOSIS — M45.8 ANKYLOSING SPONDYLITIS OF SACRAL REGION (H): ICD-10-CM

## 2019-03-05 DIAGNOSIS — M65.90 TENOSYNOVITIS: ICD-10-CM

## 2019-03-05 DIAGNOSIS — M77.9 ENTHESITIS: ICD-10-CM

## 2019-03-05 DIAGNOSIS — M46.1 SACROILIITIS (H): ICD-10-CM

## 2019-03-05 RX ORDER — MELOXICAM 15 MG/1
15 TABLET ORAL DAILY
Qty: 90 TABLET | Refills: 1 | Status: SHIPPED | OUTPATIENT
Start: 2019-03-05 | End: 2019-10-01

## 2019-03-06 DIAGNOSIS — M45.8 ANKYLOSING SPONDYLITIS OF SACRAL REGION (H): ICD-10-CM

## 2019-03-06 DIAGNOSIS — R07.89 CHEST WALL PAIN: Primary | ICD-10-CM

## 2019-04-23 ENCOUNTER — MYC REFILL (OUTPATIENT)
Dept: RHEUMATOLOGY | Facility: CLINIC | Age: 24
End: 2019-04-23

## 2019-04-23 DIAGNOSIS — M45.8 ANKYLOSING SPONDYLITIS OF SACRAL REGION (H): ICD-10-CM

## 2019-04-26 NOTE — TELEPHONE ENCOUNTER
RECORDS RECEIVED FROM: Internal   DATE RECEIVED: 5.16.19   NOTES STATUS DETAILS   OFFICE NOTE from referring provider Internal 2.28.19 Dr. Canela  1.4.19  5.18.18  5.15.17   OFFICE NOTE from other specialist N/A 7.6.18   DISCHARGE SUMMARY from hospital N/A    DISCHARGE REPORT from the ER N/A    OPERATIVE REPORT N/A    MEDICATION LIST Internal    IMAGING  (NEED IMAGES AND REPORTS)     CT SCAN N/A    CHEST XRAY (CXR) In process Scheduled for 5.16.19   TESTS     PULMONARY FUNCTION TESTING (PFT) In process Scheduled for 5.16.19   FLOW LOOP VOLUME (FVL) In process    CYSTIC FIBROSIS     CF SPUTUM CULTURE N/A

## 2019-05-13 ENCOUNTER — OFFICE VISIT (OUTPATIENT)
Dept: RHEUMATOLOGY | Facility: CLINIC | Age: 24
End: 2019-05-13
Attending: PEDIATRICS
Payer: COMMERCIAL

## 2019-05-13 ENCOUNTER — TELEPHONE (OUTPATIENT)
Dept: RHEUMATOLOGY | Facility: CLINIC | Age: 24
End: 2019-05-13

## 2019-05-13 VITALS
HEIGHT: 64 IN | HEART RATE: 81 BPM | BODY MASS INDEX: 26.84 KG/M2 | WEIGHT: 157.19 LBS | SYSTOLIC BLOOD PRESSURE: 124 MMHG | DIASTOLIC BLOOD PRESSURE: 80 MMHG | TEMPERATURE: 98 F

## 2019-05-13 DIAGNOSIS — M45.8 ANKYLOSING SPONDYLITIS OF SACRAL REGION (H): Primary | ICD-10-CM

## 2019-05-13 DIAGNOSIS — R07.81 RIB PAIN: ICD-10-CM

## 2019-05-13 LAB
ALBUMIN UR-MCNC: NEGATIVE MG/DL
ALT SERPL W P-5'-P-CCNC: 16 U/L (ref 0–50)
APPEARANCE UR: ABNORMAL
BACTERIA #/AREA URNS HPF: ABNORMAL /HPF
BASOPHILS # BLD AUTO: 0 10E9/L (ref 0–0.2)
BASOPHILS NFR BLD AUTO: 0.3 %
BILIRUB UR QL STRIP: NEGATIVE
COLOR UR AUTO: ABNORMAL
CREAT SERPL-MCNC: 0.64 MG/DL (ref 0.52–1.04)
CRP SERPL-MCNC: 3.9 MG/L (ref 0–8)
DIFFERENTIAL METHOD BLD: NORMAL
EOSINOPHIL # BLD AUTO: 0.1 10E9/L (ref 0–0.7)
EOSINOPHIL NFR BLD AUTO: 0.9 %
ERYTHROCYTE [DISTWIDTH] IN BLOOD BY AUTOMATED COUNT: 13.1 % (ref 10–15)
ERYTHROCYTE [SEDIMENTATION RATE] IN BLOOD BY WESTERGREN METHOD: 8 MM/H (ref 0–20)
GFR SERPL CREATININE-BSD FRML MDRD: >90 ML/MIN/{1.73_M2}
GLUCOSE UR STRIP-MCNC: NEGATIVE MG/DL
HCT VFR BLD AUTO: 38.4 % (ref 35–47)
HGB BLD-MCNC: 12.5 G/DL (ref 11.7–15.7)
HGB UR QL STRIP: ABNORMAL
HYALINE CASTS #/AREA URNS LPF: 1 /LPF (ref 0–2)
IMM GRANULOCYTES # BLD: 0 10E9/L (ref 0–0.4)
IMM GRANULOCYTES NFR BLD: 0.1 %
KETONES UR STRIP-MCNC: NEGATIVE MG/DL
LEUKOCYTE ESTERASE UR QL STRIP: ABNORMAL
LYMPHOCYTES # BLD AUTO: 2.3 10E9/L (ref 0.8–5.3)
LYMPHOCYTES NFR BLD AUTO: 32.9 %
MCH RBC QN AUTO: 28.9 PG (ref 26.5–33)
MCHC RBC AUTO-ENTMCNC: 32.6 G/DL (ref 31.5–36.5)
MCV RBC AUTO: 89 FL (ref 78–100)
MONOCYTES # BLD AUTO: 0.4 10E9/L (ref 0–1.3)
MONOCYTES NFR BLD AUTO: 5.8 %
MUCOUS THREADS #/AREA URNS LPF: PRESENT /LPF
NEUTROPHILS # BLD AUTO: 4.2 10E9/L (ref 1.6–8.3)
NEUTROPHILS NFR BLD AUTO: 60 %
NITRATE UR QL: NEGATIVE
NRBC # BLD AUTO: 0 10*3/UL
NRBC BLD AUTO-RTO: 0 /100
PH UR STRIP: 6.5 PH (ref 5–7)
PLATELET # BLD AUTO: 257 10E9/L (ref 150–450)
RBC # BLD AUTO: 4.33 10E12/L (ref 3.8–5.2)
RBC #/AREA URNS AUTO: 1 /HPF (ref 0–2)
SOURCE: ABNORMAL
SP GR UR STRIP: 1.01 (ref 1–1.03)
SQUAMOUS #/AREA URNS AUTO: 6 /HPF (ref 0–1)
UROBILINOGEN UR STRIP-MCNC: NORMAL MG/DL (ref 0–2)
WBC # BLD AUTO: 7 10E9/L (ref 4–11)
WBC #/AREA URNS AUTO: 5 /HPF (ref 0–5)

## 2019-05-13 PROCEDURE — G0463 HOSPITAL OUTPT CLINIC VISIT: HCPCS | Mod: ZF

## 2019-05-13 PROCEDURE — 86140 C-REACTIVE PROTEIN: CPT | Performed by: PEDIATRICS

## 2019-05-13 PROCEDURE — 86481 TB AG RESPONSE T-CELL SUSP: CPT | Performed by: PEDIATRICS

## 2019-05-13 PROCEDURE — 36415 COLL VENOUS BLD VENIPUNCTURE: CPT | Performed by: PEDIATRICS

## 2019-05-13 PROCEDURE — 82565 ASSAY OF CREATININE: CPT | Performed by: PEDIATRICS

## 2019-05-13 PROCEDURE — 81001 URINALYSIS AUTO W/SCOPE: CPT | Performed by: PEDIATRICS

## 2019-05-13 PROCEDURE — 84460 ALANINE AMINO (ALT) (SGPT): CPT | Performed by: PEDIATRICS

## 2019-05-13 PROCEDURE — 85025 COMPLETE CBC W/AUTO DIFF WBC: CPT | Performed by: PEDIATRICS

## 2019-05-13 PROCEDURE — 85652 RBC SED RATE AUTOMATED: CPT | Performed by: PEDIATRICS

## 2019-05-13 ASSESSMENT — MIFFLIN-ST. JEOR: SCORE: 1455.75

## 2019-05-13 ASSESSMENT — PAIN SCALES - GENERAL: PAINLEVEL: NO PAIN (0)

## 2019-05-13 NOTE — LETTER
5/13/2019      RE: Jaye Nicolas  [address] [place] [address]           Rheumatology History:   Date of symptom onset:  9/1/2009  Date of first visit to center:  6/11/2014  Date of AISHWARYA diagnosis:  6/11/2014  ILAR category:  enthesitis-related arthritis (ankylosing spondylitis)  BRANDON Status:  . 5/13/2019   BRANDON Status Negative     RF Status:  . 5/13/2019   Rheumatoid Factor Status Negative     HLA-B27 Status:  . 5/13/2019   HLA-B27 Status Negative           Ophthalmology History:   Iritis/Uveitis Comorbidity:  . 5/13/2019   (COIN) Iritis/Uveitis comorbidity? No     Date of last eye exam: 5/23/2018  In compliance with eye screening (y/n):  Yes         Medications:   As of completion of this visit:  Current Outpatient Medications   Medication Sig Dispense Refill     adalimumab (HUMIRA *CF*) 40 MG/0.4ML pen kit Inject 0.4 mLs (40 mg) Subcutaneous every 14 days 2 each 11     Drospiren-Eth Estrad-Levomefol (BEYAZ) 3-0.02-0.451 MG TABS Take 1 tablet by mouth daily       etanercept (ENBREL SURECLICK) 50 MG/ML autoinjector Inject 50 mg Subcutaneous once a week 4 mL 12     folic acid (FOLVITE) 1 MG tablet Take 2 tablets (2 mg) by mouth daily 180 tablet 3     loratadine (CLARITIN) 10 MG tablet Take 10 mg by mouth daily       meloxicam (MOBIC) 15 MG tablet Take 1 tablet (15 mg) by mouth daily 90 tablet 1     Date of last TB Screen:  5/13/2019         Allergies:     Allergies   Allergen Reactions     Seasonal Allergies            Problem list:     Patient Active Problem List    Diagnosis Date Noted     Rib pain 05/13/2019     Ankylosing spondylitis 02/04/2015     Sacroiliitis, enthesitis, tenosynovitis              Subjective:   Jaye is a 23 year old female who was seen in Pediatric Rheumatology clinic today for follow up.  Jaye was last seen in our clinic on 1/4/2019 and returns today accompanied by her mother.  The primary encounter diagnosis was Ankylosing spondylitis. A diagnosis of Rib pain was also  pertinent to this visit.      Goals for the visit include follow-up.  Prescribed medications have been administered regularly, without missed doses, and the medications have been tolerated well, without side effects.  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  Whether it was better food or something else is unclear to them although they do think the food had something to do with it.  Whether due to the travel or getting back to the [facility] for other reasons, she is having more trouble again with her low back.  She still having trouble with lower rib discomfort left worse than right.  While I do not have evidence that she is progressing to an ankylosing spondylitis-like picture of chest involvement they would like to pursue especially consultation to be sure that she is getting optimal management.  We discussed how there are options besides Enbrel it can be more effective, and is an example I think Humira would be a very easy switch for her if she is continuing to have breakthrough symptoms suggestive of enthesitis or arthritis.  Another option that she has had before is injection of the sacroiliac joints, but again prevention rather than repeated injection to clean up smoldering inflammation is probably a better plan.    Comprehensive Review of Systems is otherwise negative but as her mother points out is often hard to get a complaint out of Jaye.  She still will admit to having recurrent rib pain especially at the lower edges but she has not noticed shortness of breath as best as I can tell.    She just got back to town last week from school overseas.  She will be here for part of the summer then in [place], and then back again before the start of the fall semester.    Information per our standardized questionnaire is as below:   Self Report  (COIN) Patient Pain Status: 3  (COIN) Patient Global Assessment Of  "Disease Activity: 2  Score Reported By: Self  (COIN) Patient Highest Level Of Education: some college or technical school  Arthritis History  (COIN) Morning stiffness in the past week: 15 minutes or less  (COIN) Inflammatory back pain:: insidious onset;improvement with exercise;no improvement with rest  Has your arthritis stopped from trying any athletic or rigorous activities, or interfaced with your ability to do these activities: No  Have you been limited your ability to do normal daily activities in the past week: No  Did you needed help from other people to do normal activities in the past week: No  Have you used any aids or devices to help you do normal daily activities in the past week: No  Important Medical Events  (COIN) Patient has experienced drug-related serious adverse events since last encounter?: No         Examination:   Blood pressure 124/80, pulse 81, temperature 98  F (36.7  C), height 1.614 m (5' 3.54\"), weight 71.3 kg (157 lb 3 oz).  Normalized weight-for-age data not available for patients older than 20 years.  Blood pressure percentiles are not available for patients who are 18 years or older.    Jaye appears generally well and in good spirits.  Head: Normal head and hair.  Eyes: No scleral injection, pupils normal.  Ears: Normal external structures, tympanic membranes.  Nose: No cartilage deformity, congestion.  Mouth: Normal teeth, gums, tongue, mucosa.  Throat: Normal, without erythema or exudate.  Neck: Normal, without thyromegaly  Nodes: No cervical, supraclavicular, axillary, inguinal adenopathy.  Lungs: Normal effort, clear to ausculation.  I did not notice any abnormality of excursion with a deep breath.  I did not discretely palpate rib junctions with the sternum or with other ribs at the inferior margin.  Heart: Regular rate and rhythm, S1 and S2, no murmurs; normal peripheral pulses and perfusion.  Abdomen: Soft, non-tender, without hepatomegaly, splenomegaly, or masses.  Skin: No " inflammatory lesions.  Normal scratches and bruises.  Nails: No pitting, infection.  Neurological: Alert, appropriately interactive, normal cranial nerves, no deficits, normal gait walking.   Musculoskeletal: No evidence of current synovitis of the cervical spine, TMJ, sternoclavicular, acromioclavicular, glenohumeral, elbow, wrist, finger, lumbar spine, hip, knee, ankle, or toe joints. No tendonitis or bursitis.     Axial Skeleton -quite notable today as she jumps with palpation of her sacroiliac joints.  Sacro-Iliac: R Tender;L Tender  (COIN) Sacroiliac tenderness:: Yes  (COIN) Positive GRACY test:: No  (COIN) Modified Schober's Test:: No  Entheses  Iliac Insertions: R Tender;L Tender  (COIN) Tender Entheses count: 2    Total active joints:  2  Total limited joints:  0  Tender entheses count:  2         Assessment:     HLA-B27 negative sacroiliitis and enthesitis, along with other musculoskeletal complaints (for example hand writing difficulties) consistent with seronegative spondyloarthropathy, particularly (because of the sacroiliac joint involvement) ankylosing spondylitis.  These individuals are at higher risk for involvement of the chest wall with limitations developing on a mechanical basis, but at this point I am not particularly suspicious that she has disease either.  She very clearly though still has enthesitis and sacroiliitis and I think it would be worthwhile to consider the use of Humira instead of Enbrel.  It has the additional benefit for her that it would be 2 fewer shots per month.    Change Since Last Visit: Somewhat Better  ACR Functional Class: Normal  (COIN) Provider Global Assessment Of Disease Activity: 2  (This is measured on the scale of 0 - 10)  (COIN) On Medication For Treatment Of AISHWARYA?: Yes  (COIN) ESR elevated due to AISHWARYA?: No  (COIN) CRP elevated due to AISHWARYA?: No  Health counseling reviewed:  nutrition       Plan:     1. Laboratory monitoring today and every 3-4 months to monitor  medications and disease activity.  2. No imaging is needed today.  3. Continue eye examinations for uveitis monitoring every 12 months.  4. Physical activity as tolerated.  What one can do tells us how well someone is doing, and is healthy for individuals with arthritis.   5. Medications as listed, with seeking approval for Humira.  6. I did not discuss in further detail what can be done with diet as it is highly individual.  Many individuals make claims about special diets being more effective but it is not clear what will work for any given individual.  There are general recommendations for a healthy balanced diet available at the [employer] website  7. Follow-up could be this summer or in the fall.  I think anytime in the next 6 months would be fine.       If there are any new questions or concerns, I would be glad to help and can be reached through our main office at [phone] or our paging  at [phone].    Ernesto Canela MD    Addendum: Preliminary results are reassuring.  Results for orders placed or performed in visit on 05/13/19   ALT   Result Value Ref Range    ALT 16 0 - 50 U/L   CBC with platelets differential   Result Value Ref Range    WBC 7.0 4.0 - 11.0 10e9/L    RBC Count 4.33 3.8 - 5.2 10e12/L    Hemoglobin 12.5 11.7 - 15.7 g/dL    Hematocrit 38.4 35.0 - 47.0 %    MCV 89 78 - 100 fl    MCH 28.9 26.5 - 33.0 pg    MCHC 32.6 31.5 - 36.5 g/dL    RDW 13.1 10.0 - 15.0 %    Platelet Count 257 150 - 450 10e9/L    Diff Method Automated Method     % Neutrophils 60.0 %    % Lymphocytes 32.9 %    % Monocytes 5.8 %    % Eosinophils 0.9 %    % Basophils 0.3 %    % Immature Granulocytes 0.1 %    Nucleated RBCs 0 0 /100    Absolute Neutrophil 4.2 1.6 - 8.3 10e9/L    Absolute Lymphocytes 2.3 0.8 - 5.3 10e9/L    Absolute Monocytes 0.4 0.0 - 1.3 10e9/L    Absolute Eosinophils 0.1 0.0 - 0.7 10e9/L    Absolute Basophils 0.0 0.0 - 0.2 10e9/L    Abs Immature Granulocytes 0.0 0 - 0.4 10e9/L     Absolute Nucleated RBC 0.0    Creatinine   Result Value Ref Range    Creatinine 0.64 0.52 - 1.04 mg/dL    GFR Estimate >90 >60 mL/min/[1.73_m2]    GFR Estimate If Black >90 >60 mL/min/[1.73_m2]   CRP inflammation   Result Value Ref Range    CRP Inflammation 3.9 0.0 - 8.0 mg/L   Erythrocyte sedimentation rate auto   Result Value Ref Range    Sed Rate 8 0 - 20 mm/h   UA with Microscopic reflex to Culture   Result Value Ref Range    Color Urine Light Yellow     Appearance Urine Slightly Cloudy     Glucose Urine Negative NEG^Negative mg/dL    Bilirubin Urine Negative NEG^Negative    Ketones Urine Negative NEG^Negative mg/dL    Specific Gravity Urine 1.008 1.003 - 1.035    Blood Urine Trace (A) NEG^Negative    pH Urine 6.5 5.0 - 7.0 pH    Protein Albumin Urine Negative NEG^Negative mg/dL    Urobilinogen mg/dL Normal 0.0 - 2.0 mg/dL    Nitrite Urine Negative NEG^Negative    Leukocyte Esterase Urine Small (A) NEG^Negative    Source Urine     WBC Urine 5 0 - 5 /HPF    RBC Urine 1 0 - 2 /HPF    Bacteria Urine Many (A) NEG^Negative /HPF    Squamous Epithelial /HPF Urine 6 (H) 0 - 1 /HPF    Mucous Urine Present (A) NEG^Negative /LPF    Hyaline Casts 1 0 - 2 /LPF        Ernesto Canela MD    CC  Patient Care Team:  Xuan Quintana MD as PCP - General (Pediatrics)  Yonis Christine    Copy to patient  Maria Isabel, Nery Nicolas, Arnold  [address] [place] [address]

## 2019-05-13 NOTE — LETTER
May 14, 2019    Xuan Quintana MD  [facility]  [address]  [place] [address]    Dear Dr. Quintana,     I am writing to report lab results from 2019  on your patient.     Patient: Jaye Nicolas  :    1995  MRN:      [id number]    The results include:    Resulted Orders   ALT   Result Value Ref Range    ALT 16 0 - 50 U/L   CBC with platelets differential   Result Value Ref Range    WBC 7.0 4.0 - 11.0 10e9/L    RBC Count 4.33 3.8 - 5.2 10e12/L    Hemoglobin 12.5 11.7 - 15.7 g/dL    Hematocrit 38.4 35.0 - 47.0 %    MCV 89 78 - 100 fl    MCH 28.9 26.5 - 33.0 pg    MCHC 32.6 31.5 - 36.5 g/dL    RDW 13.1 10.0 - 15.0 %    Platelet Count 257 150 - 450 10e9/L    Diff Method Automated Method     % Neutrophils 60.0 %    % Lymphocytes 32.9 %    % Monocytes 5.8 %    % Eosinophils 0.9 %    % Basophils 0.3 %    % Immature Granulocytes 0.1 %    Nucleated RBCs 0 0 /100    Absolute Neutrophil 4.2 1.6 - 8.3 10e9/L    Absolute Lymphocytes 2.3 0.8 - 5.3 10e9/L    Absolute Monocytes 0.4 0.0 - 1.3 10e9/L    Absolute Eosinophils 0.1 0.0 - 0.7 10e9/L    Absolute Basophils 0.0 0.0 - 0.2 10e9/L    Abs Immature Granulocytes 0.0 0 - 0.4 10e9/L    Absolute Nucleated RBC 0.0    Creatinine   Result Value Ref Range    Creatinine 0.64 0.52 - 1.04 mg/dL    GFR Estimate >90 >60 mL/min/[1.73_m2]      Comment:      Non  GFR Calc  Starting 2018, serum creatinine based estimated GFR (eGFR) will be   calculated using the Chronic Kidney Disease Epidemiology Collaboration   (CKD-EPI) equation.      GFR Estimate If Black >90 >60 mL/min/[1.73_m2]      Comment:       GFR Calc  Starting 2018, serum creatinine based estimated GFR (eGFR) will be   calculated using the Chronic Kidney Disease Epidemiology Collaboration   (CKD-EPI) equation.     CRP inflammation   Result Value Ref Range    CRP Inflammation 3.9 0.0 - 8.0 mg/L   Erythrocyte sedimentation rate auto   Result Value Ref  Range    Sed Rate 8 0 - 20 mm/h   UA with Microscopic reflex to Culture   Result Value Ref Range    Color Urine Light Yellow     Appearance Urine Slightly Cloudy     Glucose Urine Negative NEG^Negative mg/dL    Bilirubin Urine Negative NEG^Negative    Ketones Urine Negative NEG^Negative mg/dL    Specific Gravity Urine 1.008 1.003 - 1.035    Blood Urine Trace (A) NEG^Negative    pH Urine 6.5 5.0 - 7.0 pH    Protein Albumin Urine Negative NEG^Negative mg/dL    Urobilinogen mg/dL Normal 0.0 - 2.0 mg/dL    Nitrite Urine Negative NEG^Negative    Leukocyte Esterase Urine Small (A) NEG^Negative    Source Urine     WBC Urine 5 0 - 5 /HPF    RBC Urine 1 0 - 2 /HPF    Bacteria Urine Many (A) NEG^Negative /HPF    Squamous Epithelial /HPF Urine 6 (H) 0 - 1 /HPF    Mucous Urine Present (A) NEG^Negative /LPF    Hyaline Casts 1 0 - 2 /LPF   Quantiferon TB Gold Plus   Result Value Ref Range    Quantiferon-TB Gold Plus Result Positive (A) NEG^Negative      Comment:      Interferon gamma response to M.tuberculosis antigens was detected,suggesting   infection with M.tuberculosis. Positive results in patients at low risk for   infection should be interpreted with caution and repeat testing on a new   sample should be considered    as recommended by the 2017 ATS/IDSA/CDC Clinical Practice Guidelines for   Diagnosis of Tuberculosis in Adults and Children [Lewinsohn DM et al.   Clin.Infect.Dis. 2017 64(92):111-115]. False positive results may occur in   patients with prior infection with M.marinum, M.szulgai or M.kansasii.      TB1 Ag minus Nil Value 0.26 IU/mL    TB2 Ag minus Nil Value 0.36 IU/mL    Mitogen minus Nil Result >10.00 IU/mL    Nil Result 0.28 IU/mL     These results are reassuring in that they do not suggest side effects from her therapies, but the screen for tuberculosis, while not definite, is concerning.  I recommend that she not take her Enbrel or start Humira until this is evaluated.  She is scheduled to be seen by   Viry in Pulmonary this week, and I have cc'd him in case he does or does not want her to come for evaluation, and instead be seen by an Infectious Disease specialist .  Please feel free to contact me with any questions or concerns you might have.    Sincerely yours,    Ernesto Canela MD     CC  Patient Care Team:  Xuan Quintana MD as PCP - General (Pediatrics)  Ernesto Canela MD as MD (Pediatrics)  Xuan Quintana MD as Referring Physician (Pediatrics)  Yonis Christine Nathaniel T, MD as MD (Internal Medicine)        Jaye Nicolas  [address]  [place] [address]

## 2019-05-13 NOTE — NURSING NOTE
"[facility] [[id number]]  Chief Complaint   Patient presents with     RECHECK     follow up     Initial /80   Pulse 81   Temp 98  F (36.7  C)   Ht 5' 3.54\" (161.4 cm)   Wt 157 lb 3 oz (71.3 kg)   BMI 27.37 kg/m   Estimated body mass index is 27.37 kg/m  as calculated from the following:    Height as of this encounter: 5' 3.54\" (161.4 cm).    Weight as of this encounter: 157 lb 3 oz (71.3 kg).  Medication Reconciliation: complete   Kena Hargrove LPN      "

## 2019-05-13 NOTE — TELEPHONE ENCOUNTER
PA Initiation    Medication: Humira - Initiated  Insurance Company: [employer] - Phone [phone] Fax [phone]  Pharmacy Filling the Rx: [facility] - [place], [place] - [address]  Filling Pharmacy Phone:    Filling Pharmacy Fax:    Start Date: 5/13/2019

## 2019-05-13 NOTE — PROGRESS NOTES
Rheumatology History:   Date of symptom onset:  9/1/2009  Date of first visit to center:  6/11/2014  Date of AISHWARYA diagnosis:  6/11/2014  ILAR category:  enthesitis-related arthritis (ankylosing spondylitis)  BRANDON Status:  . 5/13/2019   BRANDON Status Negative     RF Status:  . 5/13/2019   Rheumatoid Factor Status Negative     HLA-B27 Status:  . 5/13/2019   HLA-B27 Status Negative           Ophthalmology History:   Iritis/Uveitis Comorbidity:  . 5/13/2019   (COIN) Iritis/Uveitis comorbidity? No     Date of last eye exam: 5/23/2018  In compliance with eye screening (y/n):  Yes         Medications:   As of completion of this visit:  Current Outpatient Medications   Medication Sig Dispense Refill     adalimumab (HUMIRA *CF*) 40 MG/0.4ML pen kit Inject 0.4 mLs (40 mg) Subcutaneous every 14 days 2 each 11     Drospiren-Eth Estrad-Levomefol (BEYAZ) 3-0.02-0.451 MG TABS Take 1 tablet by mouth daily       etanercept (ENBREL SURECLICK) 50 MG/ML autoinjector Inject 50 mg Subcutaneous once a week 4 mL 12     folic acid (FOLVITE) 1 MG tablet Take 2 tablets (2 mg) by mouth daily 180 tablet 3     loratadine (CLARITIN) 10 MG tablet Take 10 mg by mouth daily       meloxicam (MOBIC) 15 MG tablet Take 1 tablet (15 mg) by mouth daily 90 tablet 1     Date of last TB Screen:  5/13/2019         Allergies:     Allergies   Allergen Reactions     Seasonal Allergies            Problem list:     Patient Active Problem List    Diagnosis Date Noted     Rib pain 05/13/2019     Ankylosing spondylitis 02/04/2015     Sacroiliitis, enthesitis, tenosynovitis              Subjective:   Jaye is a 23 year old female who was seen in Pediatric Rheumatology clinic today for follow up.  Jaye was last seen in our clinic on 1/4/2019 and returns today accompanied by her mother.  The primary encounter diagnosis was Ankylosing spondylitis. A diagnosis of Rib pain was also pertinent to this visit.      Goals for the visit include follow-up.  Prescribed medications  have been administered regularly, without missed doses, and the medications have been tolerated well, without side effects.  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  Whether it was better food or something else is unclear to them although they do think the food had something to do with it.  Whether due to the travel or getting back to the states for other reasons, she is having more trouble again with her low back.  She still having trouble with lower rib discomfort left worse than right.  While I do not have evidence that she is progressing to an ankylosing spondylitis-like picture of chest involvement they would like to pursue especially consultation to be sure that she is getting optimal management.  We discussed how there are options besides Enbrel it can be more effective, and is an example I think Humira would be a very easy switch for her if she is continuing to have breakthrough symptoms suggestive of enthesitis or arthritis.  Another option that she has had before is injection of the sacroiliac joints, but again prevention rather than repeated injection to clean up smoldering inflammation is probably a better plan.    Comprehensive Review of Systems is otherwise negative but as her mother points out is often hard to get a complaint out of Jaye.  She still will admit to having recurrent rib pain especially at the lower edges but she has not noticed shortness of breath as best as I can tell.    She just got back to town last week from school overseas.  She will be here for part of the summer then in [place], and then back again before the start of the fall semester.    Information per our standardized questionnaire is as below:   Self Report  (COIN) Patient Pain Status: 3  (COIN) Patient Global Assessment Of Disease Activity: 2  Score Reported By: Self  (COIN) Patient Highest Level Of Education: some  "college or technical school  Arthritis History  (COIN) Morning stiffness in the past week: 15 minutes or less  (COIN) Inflammatory back pain:: insidious onset;improvement with exercise;no improvement with rest  Has your arthritis stopped from trying any athletic or rigorous activities, or interfaced with your ability to do these activities: No  Have you been limited your ability to do normal daily activities in the past week: No  Did you needed help from other people to do normal activities in the past week: No  Have you used any aids or devices to help you do normal daily activities in the past week: No  Important Medical Events  (COIN) Patient has experienced drug-related serious adverse events since last encounter?: No         Examination:   Blood pressure 124/80, pulse 81, temperature 98  F (36.7  C), height 1.614 m (5' 3.54\"), weight 71.3 kg (157 lb 3 oz).  Normalized weight-for-age data not available for patients older than 20 years.  Blood pressure percentiles are not available for patients who are 18 years or older.    Jaye appears generally well and in good spirits.  Head: Normal head and hair.  Eyes: No scleral injection, pupils normal.  Ears: Normal external structures, tympanic membranes.  Nose: No cartilage deformity, congestion.  Mouth: Normal teeth, gums, tongue, mucosa.  Throat: Normal, without erythema or exudate.  Neck: Normal, without thyromegaly  Nodes: No cervical, supraclavicular, axillary, inguinal adenopathy.  Lungs: Normal effort, clear to ausculation.  I did not notice any abnormality of excursion with a deep breath.  I did not discretely palpate rib junctions with the sternum or with other ribs at the inferior margin.  Heart: Regular rate and rhythm, S1 and S2, no murmurs; normal peripheral pulses and perfusion.  Abdomen: Soft, non-tender, without hepatomegaly, splenomegaly, or masses.  Skin: No inflammatory lesions.  Normal scratches and bruises.  Nails: No pitting, " infection.  Neurological: Alert, appropriately interactive, normal cranial nerves, no deficits, normal gait walking.   Musculoskeletal: No evidence of current synovitis of the cervical spine, TMJ, sternoclavicular, acromioclavicular, glenohumeral, elbow, wrist, finger, lumbar spine, hip, knee, ankle, or toe joints. No tendonitis or bursitis.     Axial Skeleton -quite notable today as she jumps with palpation of her sacroiliac joints.  Sacro-Iliac: R Tender;L Tender  (COIN) Sacroiliac tenderness:: Yes  (COIN) Positive GRACY test:: No  (COIN) Modified Schober's Test:: No  Entheses  Iliac Insertions: R Tender;L Tender  (COIN) Tender Entheses count: 2    Total active joints:  2  Total limited joints:  0  Tender entheses count:  2         Assessment:     HLA-B27 negative sacroiliitis and enthesitis, along with other musculoskeletal complaints (for example hand writing difficulties) consistent with seronegative spondyloarthropathy, particularly (because of the sacroiliac joint involvement) ankylosing spondylitis.  These individuals are at higher risk for involvement of the chest wall with limitations developing on a mechanical basis, but at this point I am not particularly suspicious that she has disease either.  She very clearly though still has enthesitis and sacroiliitis and I think it would be worthwhile to consider the use of Humira instead of Enbrel.  It has the additional benefit for her that it would be 2 fewer shots per month.    Change Since Last Visit: Somewhat Better  ACR Functional Class: Normal  (COIN) Provider Global Assessment Of Disease Activity: 2  (This is measured on the scale of 0 - 10)  (COIN) On Medication For Treatment Of AISHWARYA?: Yes  (COIN) ESR elevated due to AISHWARYA?: No  (COIN) CRP elevated due to AISHWARYA?: No  Health counseling reviewed:  nutrition       Plan:     1. Laboratory monitoring today and every 3-4 months to monitor medications and disease activity.  2. No imaging is needed today.  3. Continue  eye examinations for uveitis monitoring every 12 months.  4. Physical activity as tolerated.  What one can do tells us how well someone is doing, and is healthy for individuals with arthritis.   5. Medications as listed, with seeking approval for Humira.  6. I did not discuss in further detail what can be done with diet as it is highly individual.  Many individuals make claims about special diets being more effective but it is not clear what will work for any given individual.  There are general recommendations for a healthy balanced diet available at the Arthritis Foundation website  7. Follow-up could be this summer or in the fall.  I think anytime in the next 6 months would be fine.       If there are any new questions or concerns, I would be glad to help and can be reached through our main office at [phone] or our paging  at [phone].    Ernesto Canela MD    Addendum: Preliminary results are reassuring.  Results for orders placed or performed in visit on 05/13/19   ALT   Result Value Ref Range    ALT 16 0 - 50 U/L   CBC with platelets differential   Result Value Ref Range    WBC 7.0 4.0 - 11.0 10e9/L    RBC Count 4.33 3.8 - 5.2 10e12/L    Hemoglobin 12.5 11.7 - 15.7 g/dL    Hematocrit 38.4 35.0 - 47.0 %    MCV 89 78 - 100 fl    MCH 28.9 26.5 - 33.0 pg    MCHC 32.6 31.5 - 36.5 g/dL    RDW 13.1 10.0 - 15.0 %    Platelet Count 257 150 - 450 10e9/L    Diff Method Automated Method     % Neutrophils 60.0 %    % Lymphocytes 32.9 %    % Monocytes 5.8 %    % Eosinophils 0.9 %    % Basophils 0.3 %    % Immature Granulocytes 0.1 %    Nucleated RBCs 0 0 /100    Absolute Neutrophil 4.2 1.6 - 8.3 10e9/L    Absolute Lymphocytes 2.3 0.8 - 5.3 10e9/L    Absolute Monocytes 0.4 0.0 - 1.3 10e9/L    Absolute Eosinophils 0.1 0.0 - 0.7 10e9/L    Absolute Basophils 0.0 0.0 - 0.2 10e9/L    Abs Immature Granulocytes 0.0 0 - 0.4 10e9/L    Absolute Nucleated RBC 0.0    Creatinine   Result Value Ref Range    Creatinine  0.64 0.52 - 1.04 mg/dL    GFR Estimate >90 >60 mL/min/[1.73_m2]    GFR Estimate If Black >90 >60 mL/min/[1.73_m2]   CRP inflammation   Result Value Ref Range    CRP Inflammation 3.9 0.0 - 8.0 mg/L   Erythrocyte sedimentation rate auto   Result Value Ref Range    Sed Rate 8 0 - 20 mm/h   UA with Microscopic reflex to Culture   Result Value Ref Range    Color Urine Light Yellow     Appearance Urine Slightly Cloudy     Glucose Urine Negative NEG^Negative mg/dL    Bilirubin Urine Negative NEG^Negative    Ketones Urine Negative NEG^Negative mg/dL    Specific Gravity Urine 1.008 1.003 - 1.035    Blood Urine Trace (A) NEG^Negative    pH Urine 6.5 5.0 - 7.0 pH    Protein Albumin Urine Negative NEG^Negative mg/dL    Urobilinogen mg/dL Normal 0.0 - 2.0 mg/dL    Nitrite Urine Negative NEG^Negative    Leukocyte Esterase Urine Small (A) NEG^Negative    Source Urine     WBC Urine 5 0 - 5 /HPF    RBC Urine 1 0 - 2 /HPF    Bacteria Urine Many (A) NEG^Negative /HPF    Squamous Epithelial /HPF Urine 6 (H) 0 - 1 /HPF    Mucous Urine Present (A) NEG^Negative /LPF    Hyaline Casts 1 0 - 2 /LPF        CC  Patient Care Team:  Xuan Quintana MD as PCP - General (Pediatrics)  Ernesto Canela MD as MD (Pediatrics)  Xuan Quintana MD as Referring Physician (Pediatrics)  Yonis Christine TRACIE F    Copy to patient  Nery Nicolas Thomas  [address]  [facility] [address]

## 2019-05-13 NOTE — TELEPHONE ENCOUNTER
Prior Authorization Approval    Authorization Effective Date:    Authorization Expiration Date:    Medication: Humira - Approved  Approved Dose/Quantity: 40mg/ 2  Reference #: [id number]  Insurance Company: [employer] - Phone [phone] Fax [phone]  Expected CoPay:       CoPay Card Available:      Foundation Assistance Needed:    Which Pharmacy is filling the prescription (Not needed for infusion/clinic administered): [facility]  Pharmacy Notified:  Yes  Patient Notified:  Yes      Waiting for approval letter from ins, pending quality check

## 2019-05-14 DIAGNOSIS — M45.8 ANKYLOSING SPONDYLITIS OF SACRAL REGION (H): ICD-10-CM

## 2019-05-14 LAB
GAMMA INTERFERON BACKGROUND BLD IA-ACNC: 0.28 IU/ML
M TB IFN-G BLD-IMP: POSITIVE
M TB IFN-G CD4+ BCKGRND COR BLD-ACNC: >10 IU/ML
MITOGEN IGNF BCKGRD COR BLD-ACNC: 0.26 IU/ML
MITOGEN IGNF BCKGRD COR BLD-ACNC: 0.36 IU/ML

## 2019-05-15 ENCOUNTER — TELEPHONE (OUTPATIENT)
Dept: RHEUMATOLOGY | Facility: CLINIC | Age: 24
End: 2019-05-15

## 2019-05-15 ENCOUNTER — TELEPHONE (OUTPATIENT)
Dept: PULMONOLOGY | Facility: CLINIC | Age: 24
End: 2019-05-15

## 2019-05-15 ASSESSMENT — ENCOUNTER SYMPTOMS
MUSCLE CRAMPS: 0
NECK PAIN: 0
INSOMNIA: 0
BACK PAIN: 1
MYALGIAS: 0
MUSCLE WEAKNESS: 0
DECREASED CONCENTRATION: 1
PANIC: 0
DEPRESSION: 0
STIFFNESS: 1
NERVOUS/ANXIOUS: 0
ARTHRALGIAS: 1
JOINT SWELLING: 1

## 2019-05-15 NOTE — TELEPHONE ENCOUNTER
I returned mom's call on lab results. See lab letter 5/15/2019. We discussed the lab results for the positive TB test. Mom stated that Jaye will be seeing pulmonology on 5/16/2019 and will discuss referral at that time. Jaye will not take her Enbrel or start Humira until TB issue addressed. Both mom and Jaye asked, if biologic cannot be taken, and ID appointment cannot be expedited, what medications is Jaye to take in the meantime to control her disease? 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 I will notify  and discuss with mom.

## 2019-05-15 NOTE — TELEPHONE ENCOUNTER
Contacted pt to review need for Pulmonary appointment with Dr. Baires tomorrow.  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 She denies cough, sputum, SOB, fever, chills or night sweats, she will be seen in Pulmonary for eval for ankylosing spondylitis with possible rib involvement.  Explained to pt that ID referral will be placed for TB follow up, Dr. Baires will place referral tomorrow if not already placed by rheumatology today.

## 2019-05-16 ENCOUNTER — PRE VISIT (OUTPATIENT)
Dept: PULMONOLOGY | Facility: CLINIC | Age: 24
End: 2019-05-16

## 2019-05-16 ENCOUNTER — ANCILLARY PROCEDURE (OUTPATIENT)
Dept: GENERAL RADIOLOGY | Facility: CLINIC | Age: 24
End: 2019-05-16
Payer: COMMERCIAL

## 2019-05-16 ENCOUNTER — OFFICE VISIT (OUTPATIENT)
Dept: PULMONOLOGY | Facility: CLINIC | Age: 24
End: 2019-05-16
Payer: COMMERCIAL

## 2019-05-16 VITALS
BODY MASS INDEX: 27.85 KG/M2 | DIASTOLIC BLOOD PRESSURE: 71 MMHG | HEART RATE: 87 BPM | OXYGEN SATURATION: 98 % | RESPIRATION RATE: 16 BRPM | SYSTOLIC BLOOD PRESSURE: 113 MMHG | WEIGHT: 157.19 LBS | HEIGHT: 63 IN

## 2019-05-16 DIAGNOSIS — Z22.7 LATENT TUBERCULOSIS: Primary | ICD-10-CM

## 2019-05-16 DIAGNOSIS — R07.89 CHEST WALL PAIN: ICD-10-CM

## 2019-05-16 DIAGNOSIS — M45.8 ANKYLOSING SPONDYLITIS OF SACRAL REGION (H): ICD-10-CM

## 2019-05-16 DIAGNOSIS — R07.89 CHEST WALL PAIN: Primary | ICD-10-CM

## 2019-05-16 DIAGNOSIS — R07.89 PAIN, CHEST WALL: ICD-10-CM

## 2019-05-16 PROCEDURE — G0463 HOSPITAL OUTPT CLINIC VISIT: HCPCS | Mod: ZF

## 2019-05-16 RX ORDER — DEXTROAMPHETAMINE SACCHARATE, AMPHETAMINE ASPARTATE, DEXTROAMPHETAMINE SULFATE AND AMPHETAMINE SULFATE 5; 5; 5; 5 MG/1; MG/1; MG/1; MG/1
20 TABLET ORAL DAILY
COMMUNITY

## 2019-05-16 ASSESSMENT — PAIN SCALES - GENERAL: PAINLEVEL: MILD PAIN (2)

## 2019-05-16 ASSESSMENT — MIFFLIN-ST. JEOR: SCORE: 1447.13

## 2019-05-16 NOTE — LETTER
5/16/2019       RE: Jaye Nicolas  [address]  [place] [address]     Dear Colleague,    Thank you for referring your patient, Jaye Nicolas, to the [facility] at [facility]. Please see a copy of my visit note below.    [facility]  Pulmonary Medicine  Visit Clinic Note  May 16, 2019         ASSESSMENT & PLAN       Chest Pain: Based on her history and physical exam, I suspect the chest pain is musculoskeletal in nature.  Since she is having increasing joint pains elsewhere, this could be related to her ankylosing spondylitis.  The pain is not located over the joint per se.  It could be more myofascial pain from her intercostal muscles and her rib cage.  Her pulmonary function testing shows normal lung volumes.  There is no evidence to suggest pulmonary fibrosis.  I gave her reassurance that her lungs look to be working very well, and are likely not the cause of her chest pain.    Latent TB: She had a QuantiFERON gold test performed a couple days ago which turned out to be positive.  It was previously -1-year ago.  She did travel to [place] within the year.  No other travel.  3 years ago she was in [place].  She has been on anti-TNF alpha therapy over the last year.  Currently no symptoms consistent with tuberculosis.  Her chest x-ray has yet to be formally read, but appears clear to me.  Therefore think she has latent tuberculosis.  I referred her to infectious disease for further evaluation and discussion of various treatment regimens for latent TB.      Wilmar Baires MD          Today's visit note:     Chief Complaint: Jaye Nicolas is a 23 year old year old female who is being seen for No chief complaint on file.      HISTORY OF PRESENT ILLNESS:    This is a 23-year-old female with a history of ankylosing spondylitis is presenting to the pulmonary clinic today for evaluation of chest pain.    She describes the chest  pain bilaterally in her lower rib cage.  She notices it from time to time throughout the day in an unpredictable fashion.  Sometimes it is worse with deep inspirations, but not always.  She is able to exercise without any significant pain or shortness of breath.  In fact she does not experience any shortness of breath on a regular basis.  There is no cough, wheeze, or chest tightness.    She has been on Enbrel for the last 5 years.  She is started to have some breakthrough joint symptoms, and so there are recent plans to switch her to Humira.  In the process of this change of medications, a QuantiFERON gold was ordered and was positive.  She previously had a negative one in May 2018.  She received 1 dose of Humira, however it is now on hold until the question of tuberculosis gets sorted out.    She is had no fevers, chills, night sweats, weight loss, or hemoptysis.  She has not noticed any lumps or bumps anywhere.  No skin rashes.  She went to [place] 3 years ago for a vacation.  The only travel she is done in the last year has been to [place].  She does not know anybody with tuberculosis.             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        Family History:     No lung disease in the family.           Social History:     Social History     Socioeconomic History     Marital status: Single     Spouse name: Not on file     Number of children: Not on file     Years of education: Not on file     Highest education level: Not on file   Occupational History     Not on file   Social Needs     Financial resource strain: Not  on file     Food insecurity:     Worry: Not on file     Inability: Not on file     Transportation needs:     Medical: Not on file     Non-medical: Not on file   Tobacco Use     Smoking status: Never Smoker     Smokeless tobacco: Never Used   Substance and Sexual Activity     Alcohol use: No     Drug use: No     Sexual activity: Never   Lifestyle     Physical activity:     Days per week: Not on file     Minutes per session: Not on file     Stress: Not on file   Relationships     Social connections:     Talks on phone: Not on file     Gets together: Not on file     Attends Gnosticism service: Not on file     Active member of club or organization: Not on file     Attends meetings of clubs or organizations: Not on file     Relationship status: Not on file     Intimate partner violence:     Fear of current or ex partner: Not on file     Emotionally abused: Not on file     Physically abused: Not on file     Forced sexual activity: Not on file   Other Topics Concern     Parent/sibling w/ CABG, MI or angioplasty before 65F 55M? 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 [place]          Medications:     Current Outpatient Medications   Medication     adalimumab (HUMIRA *CF*) 40 MG/0.4ML pen kit     Drospiren-Eth Estrad-Levomefol (BEYAZ) 3-0.02-0.451 MG TABS     etanercept (ENBREL SURECLICK) 50 MG/ML autoinjector     folic acid (FOLVITE) 1 MG tablet     loratadine (CLARITIN) 10 MG tablet     meloxicam (MOBIC) 15 MG tablet     No current facility-administered medications for this visit.             Review of Systems:       Answers for HPI/ROS submitted by the patient on 5/15/2019   General Symptoms: No  Skin Symptoms: No  HENT Symptoms: No  EYE SYMPTOMS: No  HEART SYMPTOMS: No  LUNG SYMPTOMS: No  INTESTINAL SYMPTOMS: No  URINARY SYMPTOMS: No  GYNECOLOGIC SYMPTOMS: No  BREAST SYMPTOMS: No  SKELETAL SYMPTOMS: Yes  BLOOD SYMPTOMS: No  NERVOUS  "SYSTEM SYMPTOMS: No  MENTAL HEALTH SYMPTOMS: Yes  Back pain: Yes  Muscle aches: No  Neck pain: No  Swollen joints: Yes  Joint pain: Yes  Bone pain: Yes  Muscle cramps: No  Muscle weakness: No  Joint stiffness: Yes  Bone fracture: No  Nervous or Anxious: No  Depression: No  Trouble sleeping: No  Trouble thinking or concentrating: Yes  Mood changes: No  Panic attacks: No        PHYSICAL EXAM:  /71 (BP Location: Right arm, Patient Position: Chair, Cuff Size: Adult Regular)   Pulse 87   Resp 16   Ht 1.6 m (5' 3\")   Wt 71.3 kg (157 lb 3 oz)   SpO2 98%   BMI 27.84 kg/m         General: Well developed, well nourished, No apparent distress  Eyes: Anicteric  Ears: Hearing grossly normal  Mouth: Oral mucosa is moist, without any lesions. No oropharyngeal exudate.  Neck: supple, no thyromegaly  Lymphatics: No cervical or supraclavicular nodes  Respiratory: Normal breathing pattern, no accessory muscle use, clear lung fields, normal diaphragm excursion.  Pain is reproducible at the inferior aspect of her anterior ribcage bilaterally. It is mild.    Cardiac: RRR, normal S1, S2. No murmurs. No JVD  Abdomen: Soft, NT/ND, no organomegaly.    Musculoskeletal: Extremities normal. No clubbing. No cyanosis. No edema.  Skin: No rash on limited exam  Neuro: Normal mentation. Normal speech.  Psych:Normal affect           Data:   All laboratory and imaging data reviewed.      Quantiferon Gold testing in 5/2018 normal  Quantiferon Gold testing on 5/13/2019 - positive.       PFT:       PFT Interpretation:  No airflow obstruction  Normal lung volmes  Normal diffusion capacity.   Valid Maneuver    CXR: Pending radiology review: on my review, rib cage appears normal.  Spine appears normal.  No pulmonary infiltrates.  Normal cardiac silouette.       Recent Results (from the past 168 hour(s))   ALT    Collection Time: 05/13/19 10:24 AM   Result Value Ref Range    ALT 16 0 - 50 U/L   CBC with platelets differential    Collection Time: " 05/13/19 10:24 AM   Result Value Ref Range    WBC 7.0 4.0 - 11.0 10e9/L    RBC Count 4.33 3.8 - 5.2 10e12/L    Hemoglobin 12.5 11.7 - 15.7 g/dL    Hematocrit 38.4 35.0 - 47.0 %    MCV 89 78 - 100 fl    MCH 28.9 26.5 - 33.0 pg    MCHC 32.6 31.5 - 36.5 g/dL    RDW 13.1 10.0 - 15.0 %    Platelet Count 257 150 - 450 10e9/L    Diff Method Automated Method     % Neutrophils 60.0 %    % Lymphocytes 32.9 %    % Monocytes 5.8 %    % Eosinophils 0.9 %    % Basophils 0.3 %    % Immature Granulocytes 0.1 %    Nucleated RBCs 0 0 /100    Absolute Neutrophil 4.2 1.6 - 8.3 10e9/L    Absolute Lymphocytes 2.3 0.8 - 5.3 10e9/L    Absolute Monocytes 0.4 0.0 - 1.3 10e9/L    Absolute Eosinophils 0.1 0.0 - 0.7 10e9/L    Absolute Basophils 0.0 0.0 - 0.2 10e9/L    Abs Immature Granulocytes 0.0 0 - 0.4 10e9/L    Absolute Nucleated RBC 0.0    Creatinine    Collection Time: 05/13/19 10:24 AM   Result Value Ref Range    Creatinine 0.64 0.52 - 1.04 mg/dL    GFR Estimate >90 >60 mL/min/[1.73_m2]    GFR Estimate If Black >90 >60 mL/min/[1.73_m2]   CRP inflammation    Collection Time: 05/13/19 10:24 AM   Result Value Ref Range    CRP Inflammation 3.9 0.0 - 8.0 mg/L   Erythrocyte sedimentation rate auto    Collection Time: 05/13/19 10:24 AM   Result Value Ref Range    Sed Rate 8 0 - 20 mm/h   Quantiferon TB Gold Plus    Collection Time: 05/13/19 10:24 AM   Result Value Ref Range    Quantiferon-TB Gold Plus Result Positive (A) NEG^Negative    TB1 Ag minus Nil Value 0.26 IU/mL    TB2 Ag minus Nil Value 0.36 IU/mL    Mitogen minus Nil Result >10.00 IU/mL    Nil Result 0.28 IU/mL   UA with Microscopic reflex to Culture    Collection Time: 05/13/19 10:27 AM   Result Value Ref Range    Color Urine Light Yellow     Appearance Urine Slightly Cloudy     Glucose Urine Negative NEG^Negative mg/dL    Bilirubin Urine Negative NEG^Negative    Ketones Urine Negative NEG^Negative mg/dL    Specific Gravity Urine 1.008 1.003 - 1.035    Blood Urine Trace (A)  NEG^Negative    pH Urine 6.5 5.0 - 7.0 pH    Protein Albumin Urine Negative NEG^Negative mg/dL    Urobilinogen mg/dL Normal 0.0 - 2.0 mg/dL    Nitrite Urine Negative NEG^Negative    Leukocyte Esterase Urine Small (A) NEG^Negative    Source Urine     WBC Urine 5 0 - 5 /HPF    RBC Urine 1 0 - 2 /HPF    Bacteria Urine Many (A) NEG^Negative /HPF    Squamous Epithelial /HPF Urine 6 (H) 0 - 1 /HPF    Mucous Urine Present (A) NEG^Negative /LPF    Hyaline Casts 1 0 - 2 /LPF   General PFT Lab (Please always keep checked)    Collection Time: 05/16/19  7:30 AM   Result Value Ref Range    FVC-Pred 3.67 L    FVC-Pre 3.76 L    FVC-%Pred-Pre 102 %    FEV1-Pre 3.27 L    FEV1-%Pred-Pre 101 %    FEV1FVC-Pred 88 %    FEV1FVC-Pre 87 %    FEFMax-Pred 6.70 L/sec    FEFMax-Pre 6.07 L/sec    FEFMax-%Pred-Pre 90 %    FEF2575-Pred 3.83 L/sec    FEF2575-Pre 3.64 L/sec    ILD1974-%Pred-Pre 95 %    ExpTime-Pre 3.21 sec    FIFMax-Pre 5.47 L/sec    VC-Pred 3.96 L    VC-Pre 3.71 L    VC-%Pred-Pre 93 %    IC-Pred 2.83 L    IC-Pre 2.61 L    IC-%Pred-Pre 92 %    ERV-Pred 1.14 L    ERV-Pre 1.10 L    ERV-%Pred-Pre 96 %    FEV1FEV6-Pred 87 %    FEV1FEV6-Pre 87 %    FRCPleth-Pred 2.61 L    FRCPleth-Pre 2.09 L    FRCPleth-%Pred-Pre 80 %    RVPleth-Pred 1.30 L    RVPleth-Pre 0.99 L    RVPleth-%Pred-Pre 76 %    TLCPleth-Pred 4.77 L    TLCPleth-Pre 4.70 L    TLCPleth-%Pred-Pre 98 %    DLCOunc-Pred 21.36 ml/min/mmHg    DLCOunc-Pre 22.76 ml/min/mmHg    DLCOunc-%Pred-Pre 106 %    DLCOcor-Pre 23.43 ml/min/mmHg    DLCOcor-%Pred-Pre 109 %    VA-Pre 4.46 L    VA-%Pred-Pre 99 %    FEV1SVC-Pred 81 %    FEV1SVC-Pre 88 %       Again, thank you for allowing me to participate in the care of your patient.      Sincerely,    Jamie Baires MD

## 2019-05-16 NOTE — PROGRESS NOTES
[facility]  Pulmonary Medicine  Visit Clinic Note  May 16, 2019         ASSESSMENT & PLAN       Chest Pain: Based on her history and physical exam, I suspect the chest pain is musculoskeletal in nature.  Since she is having increasing joint pains elsewhere, this could be related to her ankylosing spondylitis.  The pain is not located over the joint per se.  It could be more myofascial pain from her intercostal muscles and her rib cage.  Her pulmonary function testing shows normal lung volumes.  There is no evidence to suggest pulmonary fibrosis.  I gave her reassurance that her lungs look to be working very well, and are likely not the cause of her chest pain.    Latent TB: She had a QuantiFERON gold test performed a couple days ago which turned out to be positive.  It was previously -1-year ago.  She did travel to [place] within the year.  No other travel.  3 years ago she was in [place].  She has been on anti-TNF alpha therapy over the last year.  Currently no symptoms consistent with tuberculosis.  Her chest x-ray has yet to be formally read, but appears clear to me.  Therefore think she has latent tuberculosis.  I referred her to infectious disease for further evaluation and discussion of various treatment regimens for latent TB.      Wilmar Baires MD          Today's visit note:     Chief Complaint: Jaye Nicolas is a 23 year old year old female who is being seen for No chief complaint on file.      HISTORY OF PRESENT ILLNESS:    This is a 23-year-old female with a history of ankylosing spondylitis is presenting to the pulmonary clinic today for evaluation of chest pain.    She describes the chest pain bilaterally in her lower rib cage.  She notices it from time to time throughout the day in an unpredictable fashion.  Sometimes it is worse with deep inspirations, but not always.  She is able to exercise without any significant pain or shortness of breath.  In fact she does not experience any  shortness of breath on a regular basis.  There is no cough, wheeze, or chest tightness.    She has been on Enbrel for the last 5 years.  She is started to have some breakthrough joint symptoms, and so there are recent plans to switch her to Humira.  In the process of this change of medications, a QuantiFERON gold was ordered and was positive.  She previously had a negative one in May 2018.  She received 1 dose of Humira, however it is now on hold until the question of tuberculosis gets sorted out.    She is had no fevers, chills, night sweats, weight loss, or hemoptysis.  She has not noticed any lumps or bumps anywhere.  No skin rashes.  She went to [place] 3 years ago for a vacation.  The only travel she is done in the last year has been to [place].  She does not know anybody with tuberculosis.             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        Family History:     No lung disease in the family.           Social History:     Social History     Socioeconomic History     Marital status: Single     Spouse name: Not on file     Number of children: Not on file     Years of education: Not on file     Highest education level: Not on file   Occupational History     Not on file   Social Needs     Financial resource strain: Not on file     Food insecurity:     Worry: Not on file     Inability: Not on file     Transportation needs:     Medical: Not on file     Non-medical: Not on file   Tobacco Use     Smoking status: Never Smoker     Smokeless tobacco: Never Used   Substance and Sexual Activity     Alcohol use: No     Drug  use: No     Sexual activity: Never   Lifestyle     Physical activity:     Days per week: Not on file     Minutes per session: Not on file     Stress: Not on file   Relationships     Social connections:     Talks on phone: Not on file     Gets together: Not on file     Attends Nondenominational service: Not on file     Active member of club or organization: Not on file     Attends meetings of clubs or organizations: Not on file     Relationship status: Not on file     Intimate partner violence:     Fear of current or ex partner: Not on file     Emotionally abused: Not on file     Physically abused: Not on file     Forced sexual activity: Not on file   Other Topics Concern     Parent/sibling w/ CABG, MI or angioplasty before 65F 55M? 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 [place]          Medications:     Current Outpatient Medications   Medication     adalimumab (HUMIRA *CF*) 40 MG/0.4ML pen kit     Drospiren-Eth Estrad-Levomefol (BEYAZ) 3-0.02-0.451 MG TABS     etanercept (ENBREL SURECLICK) 50 MG/ML autoinjector     folic acid (FOLVITE) 1 MG tablet     loratadine (CLARITIN) 10 MG tablet     meloxicam (MOBIC) 15 MG tablet     No current facility-administered medications for this visit.             Review of Systems:       Answers for HPI/ROS submitted by the patient on 5/15/2019   General Symptoms: No  Skin Symptoms: No  HENT Symptoms: No  EYE SYMPTOMS: No  HEART SYMPTOMS: No  LUNG SYMPTOMS: No  INTESTINAL SYMPTOMS: No  URINARY SYMPTOMS: No  GYNECOLOGIC SYMPTOMS: No  BREAST SYMPTOMS: No  SKELETAL SYMPTOMS: Yes  BLOOD SYMPTOMS: No  NERVOUS SYSTEM SYMPTOMS: No  MENTAL HEALTH SYMPTOMS: Yes  Back pain: Yes  Muscle aches: No  Neck pain: No  Swollen joints: Yes  Joint pain: Yes  Bone pain: Yes  Muscle cramps: No  Muscle weakness: No  Joint stiffness: Yes  Bone fracture: No  Nervous or Anxious: No  Depression: No  Trouble sleeping:  "No  Trouble thinking or concentrating: Yes  Mood changes: No  Panic attacks: No        PHYSICAL EXAM:  /71 (BP Location: Right arm, Patient Position: Chair, Cuff Size: Adult Regular)   Pulse 87   Resp 16   Ht 1.6 m (5' 3\")   Wt 71.3 kg (157 lb 3 oz)   SpO2 98%   BMI 27.84 kg/m        General: Well developed, well nourished, No apparent distress  Eyes: Anicteric  Ears: Hearing grossly normal  Mouth: Oral mucosa is moist, without any lesions. No oropharyngeal exudate.  Neck: supple, no thyromegaly  Lymphatics: No cervical or supraclavicular nodes  Respiratory: Normal breathing pattern, no accessory muscle use, clear lung fields, normal diaphragm excursion.  Pain is reproducible at the inferior aspect of her anterior ribcage bilaterally. It is mild.    Cardiac: RRR, normal S1, S2. No murmurs. No JVD  Abdomen: Soft, NT/ND, no organomegaly.    Musculoskeletal: Extremities normal. No clubbing. No cyanosis. No edema.  Skin: No rash on limited exam  Neuro: Normal mentation. Normal speech.  Psych:Normal affect           Data:   All laboratory and imaging data reviewed.      Quantiferon Gold testing in 5/2018 normal  Quantiferon Gold testing on 5/13/2019 - positive.       PFT:       PFT Interpretation:  No airflow obstruction  Normal lung volmes  Normal diffusion capacity.   Valid Maneuver    CXR: Pending radiology review: on my review, rib cage appears normal.  Spine appears normal.  No pulmonary infiltrates.  Normal cardiac silouette.       Recent Results (from the past 168 hour(s))   ALT    Collection Time: 05/13/19 10:24 AM   Result Value Ref Range    ALT 16 0 - 50 U/L   CBC with platelets differential    Collection Time: 05/13/19 10:24 AM   Result Value Ref Range    WBC 7.0 4.0 - 11.0 10e9/L    RBC Count 4.33 3.8 - 5.2 10e12/L    Hemoglobin 12.5 11.7 - 15.7 g/dL    Hematocrit 38.4 35.0 - 47.0 %    MCV 89 78 - 100 fl    MCH 28.9 26.5 - 33.0 pg    MCHC 32.6 31.5 - 36.5 g/dL    RDW 13.1 10.0 - 15.0 %    Platelet " Count 257 150 - 450 10e9/L    Diff Method Automated Method     % Neutrophils 60.0 %    % Lymphocytes 32.9 %    % Monocytes 5.8 %    % Eosinophils 0.9 %    % Basophils 0.3 %    % Immature Granulocytes 0.1 %    Nucleated RBCs 0 0 /100    Absolute Neutrophil 4.2 1.6 - 8.3 10e9/L    Absolute Lymphocytes 2.3 0.8 - 5.3 10e9/L    Absolute Monocytes 0.4 0.0 - 1.3 10e9/L    Absolute Eosinophils 0.1 0.0 - 0.7 10e9/L    Absolute Basophils 0.0 0.0 - 0.2 10e9/L    Abs Immature Granulocytes 0.0 0 - 0.4 10e9/L    Absolute Nucleated RBC 0.0    Creatinine    Collection Time: 05/13/19 10:24 AM   Result Value Ref Range    Creatinine 0.64 0.52 - 1.04 mg/dL    GFR Estimate >90 >60 mL/min/[1.73_m2]    GFR Estimate If Black >90 >60 mL/min/[1.73_m2]   CRP inflammation    Collection Time: 05/13/19 10:24 AM   Result Value Ref Range    CRP Inflammation 3.9 0.0 - 8.0 mg/L   Erythrocyte sedimentation rate auto    Collection Time: 05/13/19 10:24 AM   Result Value Ref Range    Sed Rate 8 0 - 20 mm/h   Quantiferon TB Gold Plus    Collection Time: 05/13/19 10:24 AM   Result Value Ref Range    Quantiferon-TB Gold Plus Result Positive (A) NEG^Negative    TB1 Ag minus Nil Value 0.26 IU/mL    TB2 Ag minus Nil Value 0.36 IU/mL    Mitogen minus Nil Result >10.00 IU/mL    Nil Result 0.28 IU/mL   UA with Microscopic reflex to Culture    Collection Time: 05/13/19 10:27 AM   Result Value Ref Range    Color Urine Light Yellow     Appearance Urine Slightly Cloudy     Glucose Urine Negative NEG^Negative mg/dL    Bilirubin Urine Negative NEG^Negative    Ketones Urine Negative NEG^Negative mg/dL    Specific Gravity Urine 1.008 1.003 - 1.035    Blood Urine Trace (A) NEG^Negative    pH Urine 6.5 5.0 - 7.0 pH    Protein Albumin Urine Negative NEG^Negative mg/dL    Urobilinogen mg/dL Normal 0.0 - 2.0 mg/dL    Nitrite Urine Negative NEG^Negative    Leukocyte Esterase Urine Small (A) NEG^Negative    Source Urine     WBC Urine 5 0 - 5 /HPF    RBC Urine 1 0 - 2 /HPF     Bacteria Urine Many (A) NEG^Negative /HPF    Squamous Epithelial /HPF Urine 6 (H) 0 - 1 /HPF    Mucous Urine Present (A) NEG^Negative /LPF    Hyaline Casts 1 0 - 2 /LPF   General PFT Lab (Please always keep checked)    Collection Time: 05/16/19  7:30 AM   Result Value Ref Range    FVC-Pred 3.67 L    FVC-Pre 3.76 L    FVC-%Pred-Pre 102 %    FEV1-Pre 3.27 L    FEV1-%Pred-Pre 101 %    FEV1FVC-Pred 88 %    FEV1FVC-Pre 87 %    FEFMax-Pred 6.70 L/sec    FEFMax-Pre 6.07 L/sec    FEFMax-%Pred-Pre 90 %    FEF2575-Pred 3.83 L/sec    FEF2575-Pre 3.64 L/sec    [id number]-%Pred-Pre 95 %    ExpTime-Pre 3.21 sec    FIFMax-Pre 5.47 L/sec    VC-Pred 3.96 L    VC-Pre 3.71 L    VC-%Pred-Pre 93 %    IC-Pred 2.83 L    IC-Pre 2.61 L    IC-%Pred-Pre 92 %    ERV-Pred 1.14 L    ERV-Pre 1.10 L    ERV-%Pred-Pre 96 %    FEV1FEV6-Pred 87 %    FEV1FEV6-Pre 87 %    FRCPleth-Pred 2.61 L    FRCPleth-Pre 2.09 L    FRCPleth-%Pred-Pre 80 %    RVPleth-Pred 1.30 L    RVPleth-Pre 0.99 L    RVPleth-%Pred-Pre 76 %    TLCPleth-Pred 4.77 L    TLCPleth-Pre 4.70 L    TLCPleth-%Pred-Pre 98 %    DLCOunc-Pred 21.36 ml/min/mmHg    DLCOunc-Pre 22.76 ml/min/mmHg    DLCOunc-%Pred-Pre 106 %    DLCOcor-Pre 23.43 ml/min/mmHg    DLCOcor-%Pred-Pre 109 %    VA-Pre 4.46 L    VA-%Pred-Pre 99 %    FEV1SVC-Pred 81 %    FEV1SVC-Pre 88 %

## 2019-05-17 ASSESSMENT — ENCOUNTER SYMPTOMS
NECK PAIN: 0
MYALGIAS: 0
MUSCLE CRAMPS: 0
STIFFNESS: 1
ARTHRALGIAS: 1
MUSCLE WEAKNESS: 0
JOINT SWELLING: 0
BACK PAIN: 1

## 2019-05-19 LAB
DLCOCOR-%PRED-PRE: 109 %
DLCOCOR-PRE: 23.43 ML/MIN/MMHG
DLCOUNC-%PRED-PRE: 106 %
DLCOUNC-PRE: 22.76 ML/MIN/MMHG
DLCOUNC-PRED: 21.36 ML/MIN/MMHG
ERV-%PRED-PRE: 96 %
ERV-PRE: 1.1 L
ERV-PRED: 1.14 L
EXPTIME-PRE: 3.21 SEC
FEF2575-%PRED-PRE: 95 %
FEF2575-PRE: 3.64 L/SEC
FEF2575-PRED: 3.83 L/SEC
FEFMAX-%PRED-PRE: 90 %
FEFMAX-PRE: 6.07 L/SEC
FEFMAX-PRED: 6.7 L/SEC
FEV1-%PRED-PRE: 101 %
FEV1-PRE: 3.27 L
FEV1FEV6-PRE: 87 %
FEV1FEV6-PRED: 87 %
FEV1FVC-PRE: 87 %
FEV1FVC-PRED: 88 %
FEV1SVC-PRE: 88 %
FEV1SVC-PRED: 81 %
FIFMAX-PRE: 5.47 L/SEC
FRCPLETH-%PRED-PRE: 80 %
FRCPLETH-PRE: 2.09 L
FRCPLETH-PRED: 2.61 L
FVC-%PRED-PRE: 102 %
FVC-PRE: 3.76 L
FVC-PRED: 3.67 L
IC-%PRED-PRE: 92 %
IC-PRE: 2.61 L
IC-PRED: 2.83 L
RVPLETH-%PRED-PRE: 76 %
RVPLETH-PRE: 0.99 L
RVPLETH-PRED: 1.3 L
TLCPLETH-%PRED-PRE: 98 %
TLCPLETH-PRE: 4.7 L
TLCPLETH-PRED: 4.77 L
VA-%PRED-PRE: 99 %
VA-PRE: 4.46 L
VC-%PRED-PRE: 93 %
VC-PRE: 3.71 L
VC-PRED: 3.96 L

## 2019-05-24 ENCOUNTER — OFFICE VISIT (OUTPATIENT)
Dept: INFECTIOUS DISEASES | Facility: CLINIC | Age: 24
End: 2019-05-24
Attending: STUDENT IN AN ORGANIZED HEALTH CARE EDUCATION/TRAINING PROGRAM
Payer: COMMERCIAL

## 2019-05-24 ENCOUNTER — APPOINTMENT (OUTPATIENT)
Dept: LAB | Facility: CLINIC | Age: 24
End: 2019-05-24
Payer: COMMERCIAL

## 2019-05-24 VITALS
HEIGHT: 63 IN | DIASTOLIC BLOOD PRESSURE: 64 MMHG | OXYGEN SATURATION: 99 % | SYSTOLIC BLOOD PRESSURE: 106 MMHG | WEIGHT: 157.4 LBS | HEART RATE: 76 BPM | BODY MASS INDEX: 27.89 KG/M2 | TEMPERATURE: 98.4 F

## 2019-05-24 DIAGNOSIS — R76.12 POSITIVE QUANTIFERON-TB GOLD TEST: Primary | ICD-10-CM

## 2019-05-24 DIAGNOSIS — M45.8 ANKYLOSING SPONDYLITIS OF SACRAL REGION (H): ICD-10-CM

## 2019-05-24 PROCEDURE — G0463 HOSPITAL OUTPT CLINIC VISIT: HCPCS | Mod: ZF

## 2019-05-24 PROCEDURE — 36415 COLL VENOUS BLD VENIPUNCTURE: CPT | Performed by: STUDENT IN AN ORGANIZED HEALTH CARE EDUCATION/TRAINING PROGRAM

## 2019-05-24 PROCEDURE — 86481 TB AG RESPONSE T-CELL SUSP: CPT | Performed by: STUDENT IN AN ORGANIZED HEALTH CARE EDUCATION/TRAINING PROGRAM

## 2019-05-24 ASSESSMENT — PAIN SCALES - GENERAL: PAINLEVEL: NO PAIN (0)

## 2019-05-24 ASSESSMENT — MIFFLIN-ST. JEOR: SCORE: 1448.09

## 2019-05-24 NOTE — LETTER
5/24/2019      RE: Jaye Nicolas  [address]  [place] [address]       [facility]  Infectious Disease Consultation note  Today's Date: 05/24/2019    Recommendations:  - recheck quantiferon gold TB plus  - if negative or borderline positive, would not consider that patient has latent TB and would not pursue further intervention   - patient can resume enbrel asap   - if latent TB treatment is required, we could purse latent TB treatment concurrently with anti -TNF treatment.     Thank you for involving Infectious Disease in the care of this patient. Will continue to follow. Please do not hesitate to contact with any questions.     Mckenna Samuels  , [facility]  Pager - [phone]    Assessment:  Jaye Nicolas is a 23 year old with past medical history of ankylosing spondylitis on Enbrel for the past 5 years being considered for Humira.    Positive QuantiFERON TB Gold plus; of note previous QuantiFERON in 2016 and 18 were negative.  The most recent QuantiFERON test is a TB Gold plus in contrast to the prior ones and it is positive and borderline.  The cutoff for positivity is 0.35 and patient's value is 0.36.  Also of note is that the nil value is 0.28.  With no known exposure to tuberculosis I suspect this is a borderline false positive test. Also the values between the old test and the newer generation test s cannot be compared.     I will evaluate this with a repeat QuantiFERON TB Gold plus test.  With a similar or negative result would not pursue this as latent TB.    - Immunization status; up to date     Attestation: I have reviewed today's vital signs, medications, labs and imaging. I personally reviewed the imaging. Reviewed medical history, surgical history, and family history in Epic History tab. No updates needed to be made.  Discussed the concept of latent TB, my impressions, that latent TB treatment could be given concurrently with  anti-TNF.    -------------------------------------------------------------------------------------------------------------------    Reason for consult / Chief complaint:   Consulted by Dr. Baires for positive quantiferon gold test    History of presenting illness:  Jaye Nicolas is a 23 year old with past medical history of ankylosing spondylitis tried on methotrexate and sulfasalazine currently on Enbrel for the past 5 years.  Rheumatology is planning on starting Humira due to poorly controlled arthritis from Enbrel.    Patient has had quantiferon done in March 2016 and May 2018 which were negative at 0.03. Recent quantiferon gold plus done on May 13, 2019 is positive at a value of 0.26 and.36.  Chest x-ray Done on May 16 shows no airspace disease.  Patient denies any symptoms of cough shortness of breath weight loss or night sweats.  She does have some chest pain for which she has seen pulmonology, it started to be secondary to costochondritis.    Patient was born and raised in [place] [facility] she went to school in [place] and has done a semester into the [employer] in 2017. She lived there in an apartment with other Americans. She was mostly among americans and not much with locals. She has traveled to [place] for a week's vacation 3 years ago.  She does not recall knowing anybody who has TB or being exposed to TB.    Due to the positive QuantiFERON test Enbrel was stopped 2 weeks ago with increasing arthritis symptoms.      Social Hx:  Social History     Tobacco Use     Smoking status: Never Smoker     Smokeless tobacco: Never Used   Substance Use Topics     Alcohol use: No     Drug use: No       Allergies:   Allergies   Allergen Reactions     Seasonal Allergies          Medications:  Current Outpatient Medications   Medication Sig Dispense Refill     amphetamine-dextroamphetamine (ADDERALL) 20 MG tablet Take 20 mg by mouth daily       Drospiren-Eth Estrad-Levomefol (BEYAZ) 3-0.02-0.451 MG  "TABS Take 1 tablet by mouth daily       etanercept (ENBREL SURECLICK) 50 MG/ML autoinjector Inject 50 mg Subcutaneous once a week 4 mL 12     folic acid (FOLVITE) 1 MG tablet Take 2 tablets (2 mg) by mouth daily 180 tablet 3     loratadine (CLARITIN) 10 MG tablet Take 10 mg by mouth daily       meloxicam (MOBIC) 15 MG tablet Take 1 tablet (15 mg) by mouth daily 90 tablet 1     adalimumab (HUMIRA *CF*) 40 MG/0.4ML pen kit Inject 0.4 mLs (40 mg) Subcutaneous every 14 days (Patient not taking: Reported on 5/16/2019) 2 each 11         Past Medical Hx:  Past Medical History:   Diagnosis Date     Ankylosing spondylitis (H) 2/4/2015     Ankylosing spondylitis of sacral region (H) 2/4/2015    Sacroiliitis, enthesitis, tenosynovitis      Enthesitis 6/19/2014     AISHWARYA (juvenile idiopathic arthritis), 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     Rib pain 5/13/2019     Sacroiliitis (H) 6/19/2014     Tenosynovitis 6/19/2014         Family History:  No family history of TB     Review of Systems:  10 systems reviewed and were negative except for pertinent positives noted in my HPI      Examination:  Vital signs:   /64   Pulse 76   Temp 98.4  F (36.9  C) (Oral)   Ht 1.6 m (5' 3\")   Wt 71.4 kg (157 lb 6.4 oz)   SpO2 99%   BMI 27.88 kg/m       Constitutional: Patient in no distress  Eyes: not pale, not jaundiced  Neck: no lymphadenopathy  CVS: no added sounds  RS: clear  Abdomen: soft, BS+  Skin: no rash  Extremities: no pedal edema  Psych: Alert and oriented x 3      Laboratory:  Hematology:  Recent Labs   Lab Test 05/13/19  1024 01/04/19  1008 05/18/18  0933 12/22/17  0931 05/15/17  0935 12/22/16  0908   WBC 7.0 7.9 6.8 7.5 5.1 6.2   ANEU 4.2 4.9 4.2 4.8 2.4 2.9   ALYM 2.3 2.2 2.1 2.1 2.4 2.7   NAOMI 0.4 0.7 0.4 0.5 0.3 0.4   AEOS 0.1 0.1 0.1 0.1 0.0 0.1   HGB 12.5 12.6 12.2 11.6* 12.1 12.1   HCT 38.4 37.7 38.2 35.1 36.7 37.6    264 244 242 255 285       Chemistry:  Recent Labs   Lab Test 05/13/19  1024 " 01/04/19  1008 05/18/18  0933 12/22/17  0931 05/15/17  0935 12/22/16  0908   CR 0.64 0.56 0.66 0.65 0.64 0.82   GFRESTIMATED >90 >90 >90 >90 >90  Non  GFR Calc   90       Liver Function Studies:  Recent Labs   Lab Test 05/13/19  1024 01/04/19  1008 05/18/18  0933 12/22/17  0931 05/15/17  0935 12/22/16  0908  03/02/16  1708  10/13/14  1151   BILITOTAL  --   --   --  0.3 0.3 0.4  --  0.3  --  0.2   ALKPHOS  --   --   --  67 61 69  --  65  --  55   ALBUMIN  --   --   --  3.2* 3.4 3.3*  --  3.6  --  3.4*   AST  --   --   --  13 14 18  --  11  --  21   ALT 16 14 15 18 18 25   < > 26   < > 31    < > = values in this interval not displayed.     Microbiology:  Quantiferon 3/2016 and 5/2018 negat at 0.02 and 0.03 respectively   quantiferon gold plus 5/13/19 positive at 0.26 and 0.36 with nil of 0.28    Imaging:  CXR 5/16/19  Virgilio Samuels MD

## 2019-05-24 NOTE — PROGRESS NOTES
[facility]  Infectious Disease Consultation note  Today's Date: 05/24/2019     Addendum: Repeat quantiferon negative. I do not think patient has latent TB. No further intervention advised. Left voicemail in moms phone regarding the results.     Recommendations:  - recheck quantiferon gold TB plus  - if negative or borderline positive, would not consider that patient has latent TB and would not pursue further intervention   - patient can resume enbrel asap   - if latent TB treatment is required, we could purse latent TB treatment concurrently with anti -TNF treatment.     Thank you for involving Infectious Disease in the care of this patient. Will continue to follow. Please do not hesitate to contact with any questions.     Mckenna Samuels  , [facility]  Pager - [phone]    Assessment:  Jaye Nicolas is a 23 year old with past medical history of ankylosing spondylitis on Enbrel for the past 5 years being considered for Humira.    Positive QuantiFERON TB Gold plus; of note previous QuantiFERON in 2016 and 18 were negative.  The most recent QuantiFERON test is a TB Gold plus in contrast to the prior ones and it is positive and borderline.  The cutoff for positivity is 0.35 and patient's value is 0.36.  Also of note is that the nil value is 0.28.  With no known exposure to tuberculosis I suspect this is a borderline false positive test. Also the values between the old test and the newer generation test s cannot be compared.     I will evaluate this with a repeat QuantiFERON TB Gold plus test.  With a similar or negative result would not pursue this as latent TB.    - Immunization status; up to date     Attestation: I have reviewed today's vital signs, medications, labs and imaging. I personally reviewed the imaging. Reviewed medical history, surgical history, and family history in Epic History tab. No updates needed to be made.  Discussed the concept of latent TB, my  impressions, that latent TB treatment could be given concurrently with anti-TNF.    -------------------------------------------------------------------------------------------------------------------    Reason for consult / Chief complaint:   Consulted by Dr. Baires for positive quantiferon gold test    History of presenting illness:  Jaye Nicolas is a 23 year old with past medical history of ankylosing spondylitis tried on methotrexate and sulfasalazine currently on Enbrel for the past 5 years.  Rheumatology is planning on starting Humira due to poorly controlled arthritis from Enbrel.    Patient has had quantiferon done in March 2016 and May 2018 which were negative at 0.03. Recent quantiferon gold plus done on May 13, 2019 is positive at a value of 0.26 and.36.  Chest x-ray Done on May 16 shows no airspace disease.  Patient denies any symptoms of cough shortness of breath weight loss or night sweats.  She does have some chest pain for which she has seen pulmonology, it started to be secondary to costochondritis.    Patient was born and raised in [facility] she went to school in [place] and has done a semester into the [employer] in 2017. She lived there in an apartment with other Americans. She was mostly among americans and not much with locals. She has traveled to [place] for a week's vacation 3 years ago.  She does not recall knowing anybody who has TB or being exposed to TB.    Due to the positive QuantiFERON test Enbrel was stopped 2 weeks ago with increasing arthritis symptoms.      Social Hx:  Social History     Tobacco Use     Smoking status: Never Smoker     Smokeless tobacco: Never Used   Substance Use Topics     Alcohol use: No     Drug use: No       Allergies:   Allergies   Allergen Reactions     Seasonal Allergies          Medications:  Current Outpatient Medications   Medication Sig Dispense Refill     amphetamine-dextroamphetamine (ADDERALL) 20 MG tablet Take 20 mg by  "mouth daily       Drospiren-Eth Estrad-Levomefol (BEYAZ) 3-0.02-0.451 MG TABS Take 1 tablet by mouth daily       etanercept (ENBREL SURECLICK) 50 MG/ML autoinjector Inject 50 mg Subcutaneous once a week 4 mL 12     folic acid (FOLVITE) 1 MG tablet Take 2 tablets (2 mg) by mouth daily 180 tablet 3     loratadine (CLARITIN) 10 MG tablet Take 10 mg by mouth daily       meloxicam (MOBIC) 15 MG tablet Take 1 tablet (15 mg) by mouth daily 90 tablet 1     adalimumab (HUMIRA *CF*) 40 MG/0.4ML pen kit Inject 0.4 mLs (40 mg) Subcutaneous every 14 days (Patient not taking: Reported on 5/16/2019) 2 each 11         Past Medical Hx:  Past Medical History:   Diagnosis Date     Ankylosing spondylitis (H) 2/4/2015     Ankylosing spondylitis of sacral region (H) 2/4/2015    Sacroiliitis, enthesitis, tenosynovitis      Enthesitis 6/19/2014     AISHWARYA (juvenile idiopathic arthritis), 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     Rib pain 5/13/2019     Sacroiliitis (H) 6/19/2014     Tenosynovitis 6/19/2014         Family History:  No family history of TB     Review of Systems:  10 systems reviewed and were negative except for pertinent positives noted in my HPI      Examination:  Vital signs:   /64   Pulse 76   Temp 98.4  F (36.9  C) (Oral)   Ht 1.6 m (5' 3\")   Wt 71.4 kg (157 lb 6.4 oz)   SpO2 99%   BMI 27.88 kg/m      Constitutional: Patient in no distress  Eyes: not pale, not jaundiced  Neck: no lymphadenopathy  CVS: no added sounds  RS: clear  Abdomen: soft, BS+  Skin: no rash  Extremities: no pedal edema  Psych: Alert and oriented x 3      Laboratory:  Hematology:  Recent Labs   Lab Test 05/13/19  1024 01/04/19  1008 05/18/18  0933 12/22/17  0931 05/15/17  0935 12/22/16  0908   WBC 7.0 7.9 6.8 7.5 5.1 6.2   ANEU 4.2 4.9 4.2 4.8 2.4 2.9   ALYM 2.3 2.2 2.1 2.1 2.4 2.7   NAOMI 0.4 0.7 0.4 0.5 0.3 0.4   AEOS 0.1 0.1 0.1 0.1 0.0 0.1   HGB 12.5 12.6 12.2 11.6* 12.1 12.1   HCT 38.4 37.7 38.2 35.1 36.7 37.6    264 244 242 " 255 285       Chemistry:  Recent Labs   Lab Test 05/13/19  1024 01/04/19  1008 05/18/18  0933 12/22/17  0931 05/15/17  0935 12/22/16  0908   CR 0.64 0.56 0.66 0.65 0.64 0.82   GFRESTIMATED >90 >90 >90 >90 >90  Non  GFR Calc   90       Liver Function Studies:  Recent Labs   Lab Test 05/13/19  1024 01/04/19  1008 05/18/18  0933 12/22/17  0931 05/15/17  0935 12/22/16  0908  03/02/16  1708  10/13/14  1151   BILITOTAL  --   --   --  0.3 0.3 0.4  --  0.3  --  0.2   ALKPHOS  --   --   --  67 61 69  --  65  --  55   ALBUMIN  --   --   --  3.2* 3.4 3.3*  --  3.6  --  3.4*   AST  --   --   --  13 14 18  --  11  --  21   ALT 16 14 15 18 18 25   < > 26   < > 31    < > = values in this interval not displayed.         Microbiology:  Quantiferon 3/2016 and 5/2018 negat at 0.02 and 0.03 respectively   quantiferon gold plus 5/13/19 positive at 0.26 and 0.36 with nil of 0.28    Imaging:  CXR 5/16/19  Clear           Answers for HPI/ROS submitted by the patient on 5/17/2019   General Symptoms: No  Skin Symptoms: No  HENT Symptoms: No  EYE SYMPTOMS: No  HEART SYMPTOMS: No  LUNG SYMPTOMS: No  INTESTINAL SYMPTOMS: No  URINARY SYMPTOMS: No  GYNECOLOGIC SYMPTOMS: No  BREAST SYMPTOMS: No  SKELETAL SYMPTOMS: Yes  BLOOD SYMPTOMS: No  NERVOUS SYSTEM SYMPTOMS: No  MENTAL HEALTH SYMPTOMS: No  Back pain: Yes  Muscle aches: No  Neck pain: No  Swollen joints: No  Joint pain: Yes  Bone pain: No  Muscle cramps: No  Muscle weakness: No  Joint stiffness: Yes  Bone fracture: No

## 2019-05-24 NOTE — NURSING NOTE
"Chief Complaint   Patient presents with     Consult     latent tb     Vital signs:  Temp: 98.4  F (36.9  C) Temp src: Oral BP: 106/64 Pulse: 76     SpO2: 99 %     Height: 160 cm (5' 3\") Weight: 71.4 kg (157 lb 6.4 oz)  Estimated body mass index is 27.88 kg/m  as calculated from the following:    Height as of this encounter: 1.6 m (5' 3\").    Weight as of this encounter: 71.4 kg (157 lb 6.4 oz).      \  Shira Bass    "

## 2019-05-28 LAB
GAMMA INTERFERON BACKGROUND BLD IA-ACNC: 0.21 IU/ML
M TB IFN-G BLD-IMP: NEGATIVE
M TB IFN-G CD4+ BCKGRND COR BLD-ACNC: >10 IU/ML
MITOGEN IGNF BCKGRD COR BLD-ACNC: 0.09 IU/ML
MITOGEN IGNF BCKGRD COR BLD-ACNC: 0.11 IU/ML

## 2019-05-30 DIAGNOSIS — M45.8 ANKYLOSING SPONDYLITIS OF SACRAL REGION (H): ICD-10-CM

## 2019-10-01 DIAGNOSIS — M46.1 SACROILIITIS (H): ICD-10-CM

## 2019-10-01 DIAGNOSIS — M77.9 ENTHESITIS: ICD-10-CM

## 2019-10-01 DIAGNOSIS — M65.90 TENOSYNOVITIS: ICD-10-CM

## 2019-10-01 DIAGNOSIS — M45.8 ANKYLOSING SPONDYLITIS OF SACRAL REGION (H): Primary | ICD-10-CM

## 2019-10-01 RX ORDER — MELOXICAM 15 MG/1
15 TABLET ORAL DAILY
Qty: 30 TABLET | Refills: 1 | Status: SHIPPED | OUTPATIENT
Start: 2019-10-01 | End: 2020-02-05

## 2019-11-03 ENCOUNTER — HEALTH MAINTENANCE LETTER (OUTPATIENT)
Age: 24
End: 2019-11-03

## 2019-12-27 ENCOUNTER — OFFICE VISIT (OUTPATIENT)
Dept: RHEUMATOLOGY | Facility: CLINIC | Age: 24
End: 2019-12-27
Attending: PEDIATRICS
Payer: COMMERCIAL

## 2019-12-27 VITALS
WEIGHT: 149.69 LBS | RESPIRATION RATE: 16 BRPM | HEIGHT: 63 IN | HEART RATE: 92 BPM | TEMPERATURE: 98 F | DIASTOLIC BLOOD PRESSURE: 78 MMHG | BODY MASS INDEX: 26.52 KG/M2 | SYSTOLIC BLOOD PRESSURE: 112 MMHG

## 2019-12-27 DIAGNOSIS — M45.8 ANKYLOSING SPONDYLITIS OF SACRAL REGION (H): Primary | ICD-10-CM

## 2019-12-27 PROCEDURE — G0463 HOSPITAL OUTPT CLINIC VISIT: HCPCS | Mod: ZF

## 2019-12-27 ASSESSMENT — PAIN SCALES - GENERAL: PAINLEVEL: NO PAIN (0)

## 2019-12-27 ASSESSMENT — MIFFLIN-ST. JEOR: SCORE: 1408.62

## 2019-12-27 NOTE — NURSING NOTE
"Chief Complaint   Patient presents with     Arthritis     Ankylosing spondylitis.     Vitals:    12/27/19 0732   BP: 112/78   BP Location: Right arm   Patient Position: Chair   Pulse: 92   Resp: 16   Temp: 98  F (36.7  C)   TempSrc: Oral   Weight: 149 lb 11.1 oz (67.9 kg)   Height: 5' 3.03\" (160.1 cm)      Lona Leiva M.A.  December 27, 2019  "

## 2019-12-27 NOTE — PROGRESS NOTES
Rheumatology History:   Date of symptom onset:  9/1/2009  Date of first visit to center:  6/11/2014  Date of AISHWARYA diagnosis:  6/11/2014  ILAR category:  enthesitis-related arthritis (A.S.)  BRANDON Status:  . 12/27/2019   BRANDON Status Negative     RF Status:  . 12/27/2019   Rheumatoid Factor Status Negative     HLA-B27 Status:  . 12/27/2019   HLA-B27 Status Negative           Ophthalmology History:   Iritis/Uveitis Comorbidity:  . 12/27/2019   (COIN) Iritis/Uveitis comorbidity? No     Date of last eye exam: 5/23/2018         Medications:   As of completion of this visit:  Current Outpatient Medications   Medication Sig Dispense Refill     adalimumab (HUMIRA *CF*) 40 MG/0.4ML pen kit Inject 0.4 mLs (40 mg) Subcutaneous every 14 days 2 each 11     amphetamine-dextroamphetamine (ADDERALL) 20 MG tablet Take 20 mg by mouth daily       Drospiren-Eth Estrad-Levomefol (BEYAZ) 3-0.02-0.451 MG TABS Take 1 tablet by mouth daily       folic acid (FOLVITE) 1 MG tablet Take 2 tablets (2 mg) by mouth daily 180 tablet 3     loratadine (CLARITIN) 10 MG tablet Take 10 mg by mouth daily       meloxicam (MOBIC) 15 MG tablet Take 1 tablet (15 mg) by mouth daily Please have labs done [phone] 30 tablet 1     Prescribed medications have been administered regularly without missed doses.  The medications have been tolerated well without side effects.  Date of last TB Screen:  5/24/2019         Allergies:     Allergies   Allergen Reactions     Seasonal Allergies            Problem list:     Patient Active Problem List    Diagnosis Date Noted     Rib pain 05/13/2019     Ankylosing spondylitis 02/04/2015     Sacroiliitis, enthesitis, tenosynovitis              Subjective:   Jaye is a 24 year old female who was seen in Pediatric Rheumatology clinic today for follow up.  Jaye was last seen in our clinic on 5/13/2019 and returns today accompanied by her mother.  The encounter diagnosis was Ankylosing spondylitis.  Goals for the visit include  follow-up and to discuss transition.      Jaye reports that the switch to Humira has gone well.  She still has some stiffness in her hands but not as much as before.  Her mother asked whether this is something we see with spondyloarthropathy and we talked about how this is something we have heard commonly although there is little in the literature about it.  My suspicion is that it is more than enthesitis and arthritis as I have not seen it progressed to deforming difficulties.  We talked about the back as well and there was a question as to whether she is doing as well as she is because of the medicine she has been on.  I pointed out that is the goal of the therapy which is to prevent progression and if possible extinguish the disease activity altogether.  Her mother again reported that she thinks her daughter under-reports her symptoms, but Jaye again politely pointed out that only she knows how she is really feeling and that she thinks the current plan is definitely better.    We also briefly talked about the confusion last time around her TB screening test.  It was repeated 11 days later and was negative.  She saw infectious diseases who felt that it was probably all a laboratory error the first time.    She completed our comprehensive Review of Systems form and it is otherwise negative for any new concerns.  She is heading back to college in January for her final semester of her undergraduate degree.  She is debating whether to do an additional year while working towards her [facility]; this would involve getting a masters degree.  She will probably be continue to live on the [facility] so we talked about some options regarding transitioning.  Once she knows where she is going to be we can get more specific suggestions for her.    Information per our standardized questionnaire is as below:   Self Report  (COIN) Patient Pain Status: 2  (COIN) Patient Global Assessment Of Disease Activity: 2  Score Reported By:  "Self  (COIN) Patient Highest Level Of Education: some college or technical school  Arthritis History  (COIN) Morning stiffness in the past week: 15 minutes or less  (COIN) Inflammatory back pain:: improvement with exercise;no improvement with rest;pain at night with improvement upon arising  Has your arthritis stopped from trying any athletic or rigorous activities, or interfaced with your ability to do these activities: No  Have you been limited your ability to do normal daily activities in the past week: No  Did you needed help from other people to do normal activities in the past week: No  Have you used any aids or devices to help you do normal daily activities in the past week: No  Important Medical Events  (COIN) Patient has experienced drug-related serious adverse events since last encounter?: No         Examination:   Blood pressure 112/78, pulse 92, temperature 98  F (36.7  C), temperature source Oral, resp. rate 16, height 1.601 m (5' 3.03\"), weight 67.9 kg (149 lb 11.1 oz).  Normalized weight-for-age data not available for patients older than 20 years.  Blood pressure percentiles are not available for patients who are 18 years or older.    Jaye appears generally well and in good spirits.  Head: Normal head and hair.  Eyes: No scleral injection, pupils normal.  Ears: Normal external structures, tympanic membranes.  Nose: No cartilage deformity, congestion.  Mouth: Normal teeth, gums, tongue, mucosa.  Throat: Normal, without erythema or exudate.  Neck: Normal, without thyromegaly  Nodes: No cervical, supraclavicular, axillary, inguinal adenopathy.  Lungs: Normal effort, clear to ausculation.  Heart: Regular rate and rhythm, S1 and S2, no murmurs; normal peripheral pulses and perfusion.  Abdomen: Soft, non-tender, without hepatomegaly, splenomegaly, or masses.  Skin: No inflammatory lesions.  Normal scratches and bruises.  Nails: No pitting, infection.  Neurological: Alert, appropriately interactive, normal " cranial nerves, no deficits, normal gait walking.   Musculoskeletal: No evidence of current synovitis of the cervical spine, TMJ, sternoclavicular, acromioclavicular, glenohumeral, elbow, wrist, finger, lumbar spine, hip, knee, ankle, or toe joints. No tendonitis or bursitis.    Axial Skeleton  Clearly no difficulty with touching her toes, extension, or twisting to reposition from supine to prone on the exam table.  She moves well.  Sacro-Iliac: R Tender;L Tender  (COIN) Sacroiliac tenderness:: Yes  (COIN) Positive GRACY test:: No  (COIN) Modified Schober's Test:: No  Entheses  (COIN) Tender Entheses count: 0    Total active joints:  2  Total limited joints:  0  Tender entheses count:  0         Last Lab Results:     No visits with results within 1 Day(s) from this visit.   Latest known visit with results is:   Office Visit on 05/24/2019   Component Date Value     Quantiferon-TB Gold Plus* 05/24/2019 Negative      TB1 Ag minus Nil Value 05/24/2019 0.11      TB2 Ag minus Nil Value 05/24/2019 0.09      Mitogen minus Nil Result 05/24/2019 >10.00      Nil Result 05/24/2019 0.21             Assessment:   History of sacroiliitis and enthesitis consistent with an axial spondyloarthropathy (ankylosing spondylitis).  Current therapies are working well for her.  There is a possibility of additional therapy at this time, and there certainly will be many options going forward as this group of conditions (spondyloarthropathies) as many new products in the pipeline.  I remain optimistic that she will do well in the long run.    Change Since Last Visit: Somewhat Worse  ACR Functional Class: Normal  (COIN) Provider Global Assessment Of Disease Activity: 1  (This is measured on the scale of 0 - 10)  (COIN) On Medication For Treatment Of AISHWARYA?: Yes  Health counseling reviewed:  transition       Plan:       1. Laboratory tests every 6 months to monitor medications and disease activity.  They will track down the order set from November  and have it forwarded to us at [phone]  2. No imaging is needed today.  3. Physical activity as tolerated.  What one can do tells us how well someone is doing, and is healthy for individuals with arthritis.   4. Medications: No change.  5. Follow-up in the summer, most likely an adult clinic but possibly here if we have additional issues to work through regarding transition.  I did clarify that I will not be following her as she enters graduate school or the workforce.    If there are any new questions or concerns, I would be glad to help and can be reached through our main office at [phone] or our paging  at [phone].    Ernesto Canela MD    This note was dictated and might contain unintended typographical errors missed in proofreading.  If there are questions/concerns, please contact the author.    CC  Patient Care Team:  Eros Quintana MD as PCP - General (Pediatrics)  Ernesto Canela MD as MD (Pediatrics)  Eros Quintana MD as Referring Physician (Pediatrics)  Yonis Christine Nathaniel T, MD as MD (Internal Medicine)  EROS QUINTANA    Copy to patient  Maria IsabelNery Thomas  [address] [place] [address]

## 2019-12-27 NOTE — LETTER
12/27/2019      RE: Jaye Nicolas  [address]  [place] [address]           Rheumatology History:   Date of symptom onset:  9/1/2009  Date of first visit to center:  6/11/2014  Date of AISHWARYA diagnosis:  6/11/2014  ILAR category:  enthesitis-related arthritis (A.S.)  BRANDON Status:  . 12/27/2019   BRANDON Status Negative     RF Status:  . 12/27/2019   Rheumatoid Factor Status Negative     HLA-B27 Status:  . 12/27/2019   HLA-B27 Status Negative           Ophthalmology History:   Iritis/Uveitis Comorbidity:  . 12/27/2019   (COIN) Iritis/Uveitis comorbidity? No     Date of last eye exam: 5/23/2018         Medications:   As of completion of this visit:  Current Outpatient Medications   Medication Sig Dispense Refill     adalimumab (HUMIRA *CF*) 40 MG/0.4ML pen kit Inject 0.4 mLs (40 mg) Subcutaneous every 14 days 2 each 11     amphetamine-dextroamphetamine (ADDERALL) 20 MG tablet Take 20 mg by mouth daily       Drospiren-Eth Estrad-Levomefol (BEYAZ) 3-0.02-0.451 MG TABS Take 1 tablet by mouth daily       folic acid (FOLVITE) 1 MG tablet Take 2 tablets (2 mg) by mouth daily 180 tablet 3     loratadine (CLARITIN) 10 MG tablet Take 10 mg by mouth daily       meloxicam (MOBIC) 15 MG tablet Take 1 tablet (15 mg) by mouth daily Please have labs done [phone] 30 tablet 1     Prescribed medications have been administered regularly without missed doses.  The medications have been tolerated well without side effects.  Date of last TB Screen:  5/24/2019         Allergies:     Allergies   Allergen Reactions     Seasonal Allergies            Problem list:     Patient Active Problem List    Diagnosis Date Noted     Rib pain 05/13/2019     Ankylosing spondylitis 02/04/2015     Sacroiliitis, enthesitis, tenosynovitis              Subjective:   Jaye is a 24 year old female who was seen in Pediatric Rheumatology clinic today for follow up.  Jaye was last seen in our clinic on 5/13/2019 and returns today accompanied by her mother.   The encounter diagnosis was Ankylosing spondylitis.  Goals for the visit include follow-up and to discuss transition.      Jaye reports that the switch to Humira has gone well.  She still has some stiffness in her hands but not as much as before.  Her mother asked whether this is something we see with spondyloarthropathy and we talked about how this is something we have heard commonly although there is little in the literature about it.  My suspicion is that it is more than enthesitis and arthritis as I have not seen it progressed to deforming difficulties.  We talked about the back as well and there was a question as to whether she is doing as well as she is because of the medicine she has been on.  I pointed out that is the goal of the therapy which is to prevent progression and if possible extinguish the disease activity altogether.  Her mother again reported that she thinks her daughter under-reports her symptoms, but Jaye again politely pointed out that only she knows how she is really feeling and that she thinks the current plan is definitely better.    We also briefly talked about the confusion last time around her TB screening test.  It was repeated 11 days later and was negative.  She saw infectious diseases who felt that it was probably all a laboratory error the first time.    She completed our comprehensive Review of Systems form and it is otherwise negative for any new concerns.  She is heading back to college in January for her final semester of her undergraduate degree.  She is debating whether to do an additional year while working towards her [employer]; this would involve getting a masters degree.  She will probably be continue to live on the [facility] so we talked about some options regarding transitioning.  Once she knows where she is going to be we can get more specific suggestions for her.    Information per our standardized questionnaire is as below:   Self Report  (COIN) Patient Pain Status:  "2  (COIN) Patient Global Assessment Of Disease Activity: 2  Score Reported By: Self  (COIN) Patient Highest Level Of Education: some college or technical school  Arthritis History  (COIN) Morning stiffness in the past week: 15 minutes or less  (COIN) Inflammatory back pain:: improvement with exercise;no improvement with rest;pain at night with improvement upon arising  Has your arthritis stopped from trying any athletic or rigorous activities, or interfaced with your ability to do these activities: No  Have you been limited your ability to do normal daily activities in the past week: No  Did you needed help from other people to do normal activities in the past week: No  Have you used any aids or devices to help you do normal daily activities in the past week: No  Important Medical Events  (COIN) Patient has experienced drug-related serious adverse events since last encounter?: No         Examination:   Blood pressure 112/78, pulse 92, temperature 98  F (36.7  C), temperature source Oral, resp. rate 16, height 1.601 m (5' 3.03\"), weight 67.9 kg (149 lb 11.1 oz).  Normalized weight-for-age data not available for patients older than 20 years.  Blood pressure percentiles are not available for patients who are 18 years or older.    Jaye appears generally well and in good spirits.  Head: Normal head and hair.  Eyes: No scleral injection, pupils normal.  Ears: Normal external structures, tympanic membranes.  Nose: No cartilage deformity, congestion.  Mouth: Normal teeth, gums, tongue, mucosa.  Throat: Normal, without erythema or exudate.  Neck: Normal, without thyromegaly  Nodes: No cervical, supraclavicular, axillary, inguinal adenopathy.  Lungs: Normal effort, clear to ausculation.  Heart: Regular rate and rhythm, S1 and S2, no murmurs; normal peripheral pulses and perfusion.  Abdomen: Soft, non-tender, without hepatomegaly, splenomegaly, or masses.  Skin: No inflammatory lesions.  Normal scratches and bruises.  Nails: " No pitting, infection.  Neurological: Alert, appropriately interactive, normal cranial nerves, no deficits, normal gait walking.   Musculoskeletal: No evidence of current synovitis of the cervical spine, TMJ, sternoclavicular, acromioclavicular, glenohumeral, elbow, wrist, finger, lumbar spine, hip, knee, ankle, or toe joints. No tendonitis or bursitis.    Axial Skeleton  Clearly no difficulty with touching her toes, extension, or twisting to reposition from supine to prone on the exam table.  She moves well.  Sacro-Iliac: R Tender;L Tender  (COIN) Sacroiliac tenderness:: Yes  (COIN) Positive GRACY test:: No  (COIN) Modified Schober's Test:: No  Entheses  (COIN) Tender Entheses count: 0    Total active joints:  2  Total limited joints:  0  Tender entheses count:  0         Last Lab Results:     No visits with results within 1 Day(s) from this visit.   Latest known visit with results is:   Office Visit on 05/24/2019   Component Date Value     Quantiferon-TB Gold Plus* 05/24/2019 Negative      TB1 Ag minus Nil Value 05/24/2019 0.11      TB2 Ag minus Nil Value 05/24/2019 0.09      Mitogen minus Nil Result 05/24/2019 >10.00      Nil Result 05/24/2019 0.21             Assessment:   History of sacroiliitis and enthesitis consistent with an axial spondyloarthropathy (ankylosing spondylitis).  Current therapies are working well for her.  There is a possibility of additional therapy at this time, and there certainly will be many options going forward as this group of conditions (spondyloarthropathies) as many new products in the pipeline.  I remain optimistic that she will do well in the long run.    Change Since Last Visit: Somewhat Worse  ACR Functional Class: Normal  (COIN) Provider Global Assessment Of Disease Activity: 1  (This is measured on the scale of 0 - 10)  (COIN) On Medication For Treatment Of AISHWARYA?: Yes  Health counseling reviewed:  transition       Plan:       1. Laboratory tests every 6 months to monitor  medications and disease activity.  They will track down the order set from November and have it forwarded to us at [phone]  2. No imaging is needed today.  3. Physical activity as tolerated.  What one can do tells us how well someone is doing, and is healthy for individuals with arthritis.   4. Medications: No change.  5. Follow-up in the summer, most likely an adult clinic but possibly here if we have additional issues to work through regarding transition.  I did clarify that I will not be following her as she enters graduate school or the workforce.    If there are any new questions or concerns, I would be glad to help and can be reached through our main office at [phone] or our paging  at [phone].    Ernesto Canela MD    This note was dictated and might contain unintended typographical errors missed in proofreading.  If there are questions/concerns, please contact the author.    CC  Patient Care Team:  Eros Quintana MD as PCP - General (Pediatrics)  Ernesto Canela MD as MD (Pediatrics)  Eros Quintana MD as Referring Physician (Pediatrics)  Yonis Christine Nathaniel T, MD as MD (Internal Medicine)  EROS QUINTANA    Copy to patient  Maria IsabelNery Thomas  [address]  [facility] [address]    Ernesto Canela MD

## 2019-12-27 NOTE — PATIENT INSTRUCTIONS
Send labs to [phone].    [facility]    For Help:  The Pediatric Call Center at [phone] can help with scheduling of routine follow up visits.  Dana Pichardo and Clarita Guadalupe are the Nurse Coordinators for the Division of Pediatric Rheumatology and can be reached directly at [phone]. They can help with questions about your child s rheumatic condition, medications, and test results.  For emergencies after hours or on the weekends, please call the page  at [phone] and ask to speak to the physician on-call for Pediatric Rheumatology. Please do not use [facility] for urgent requests.  Main  Services:  [phone]  o Hmong/Luxembourger/East [place]orese: [phone]  o Gibraltarian: [phone]  o Occitan: [phone]    For Patient Education Materials:  [email or web address]

## 2019-12-27 NOTE — LETTER
12/27/2019      RE: Jaye Nicolas  [address]  [place] [address]           Rheumatology History:   Date of symptom onset:  9/1/2009  Date of first visit to center:  6/11/2014  Date of AISHWARYA diagnosis:  6/11/2014  ILAR category:  enthesitis-related arthritis (A.S.)  BRANDON Status:  . 12/27/2019   BRANDON Status Negative     RF Status:  . 12/27/2019   Rheumatoid Factor Status Negative     HLA-B27 Status:  . 12/27/2019   HLA-B27 Status Negative           Ophthalmology History:   Iritis/Uveitis Comorbidity:  . 12/27/2019   (COIN) Iritis/Uveitis comorbidity? No     Date of last eye exam: 5/23/2018         Medications:   As of completion of this visit:  Current Outpatient Medications   Medication Sig Dispense Refill     adalimumab (HUMIRA *CF*) 40 MG/0.4ML pen kit Inject 0.4 mLs (40 mg) Subcutaneous every 14 days 2 each 11     amphetamine-dextroamphetamine (ADDERALL) 20 MG tablet Take 20 mg by mouth daily       Drospiren-Eth Estrad-Levomefol (BEYAZ) 3-0.02-0.451 MG TABS Take 1 tablet by mouth daily       folic acid (FOLVITE) 1 MG tablet Take 2 tablets (2 mg) by mouth daily 180 tablet 3     loratadine (CLARITIN) 10 MG tablet Take 10 mg by mouth daily       meloxicam (MOBIC) 15 MG tablet Take 1 tablet (15 mg) by mouth daily Please have labs done [phone] 30 tablet 1     Prescribed medications have been administered regularly without missed doses.  The medications have been tolerated well without side effects.  Date of last TB Screen:  5/24/2019         Allergies:     Allergies   Allergen Reactions     Seasonal Allergies            Problem list:     Patient Active Problem List    Diagnosis Date Noted     Rib pain 05/13/2019     Ankylosing spondylitis 02/04/2015     Sacroiliitis, enthesitis, tenosynovitis              Subjective:   Jaye is a 24 year old female who was seen in Pediatric Rheumatology clinic today for follow up.  Jaye was last seen in our clinic on 5/13/2019 and returns today accompanied by her  mother.  The encounter diagnosis was Ankylosing spondylitis.  Goals for the visit include follow-up and to discuss transition.      Jaye reports that the switch to Humira has gone well.  She still has some stiffness in her hands but not as much as before.  Her mother asked whether this is something we see with spondyloarthropathy and we talked about how this is something we have heard commonly although there is little in the literature about it.  My suspicion is that it is more than enthesitis and arthritis as I have not seen it progressed to deforming difficulties.  We talked about the back as well and there was a question as to whether she is doing as well as she is because of the medicine she has been on.  I pointed out that is the goal of the therapy which is to prevent progression and if possible extinguish the disease activity altogether.  Her mother again reported that she thinks her daughter under-reports her symptoms, but Jaye again politely pointed out that only she knows how she is really feeling and that she thinks the current plan is definitely better.    We also briefly talked about the confusion last time around her TB screening test.  It was repeated 11 days later and was negative.  She saw infectious diseases who felt that it was probably all a laboratory error the first time.    She completed our comprehensive Review of Systems form and it is otherwise negative for any new concerns.  She is heading back to college in January for her final semester of her undergraduate degree.  She is debating whether to do an additional year while working towards her [employer]; this would involve getting a masters degree.  [place] we talked about some options regarding transitioning.  Once she knows where she is going to be we can get more specific suggestions for her.    Information per our standardized questionnaire is as below:   Self Report  (COIN) Patient Pain  "Status: 2  (COIN) Patient Global Assessment Of Disease Activity: 2  Score Reported By: Self  (COIN) Patient Highest Level Of Education: some college or technical school  Arthritis History  (COIN) Morning stiffness in the past week: 15 minutes or less  (COIN) Inflammatory back pain:: improvement with exercise;no improvement with rest;pain at night with improvement upon arising  Has your arthritis stopped from trying any athletic or rigorous activities, or interfaced with your ability to do these activities: No  Have you been limited your ability to do normal daily activities in the past week: No  Did you needed help from other people to do normal activities in the past week: No  Have you used any aids or devices to help you do normal daily activities in the past week: No  Important Medical Events  (COIN) Patient has experienced drug-related serious adverse events since last encounter?: No         Examination:   Blood pressure 112/78, pulse 92, temperature 98  F (36.7  C), temperature source Oral, resp. rate 16, height 1.601 m (5' 3.03\"), weight 67.9 kg (149 lb 11.1 oz).  Normalized weight-for-age data not available for patients older than 20 years.  Blood pressure percentiles are not available for patients who are 18 years or older.    Jaye appears generally well and in good spirits.  Head: Normal head and hair.  Eyes: No scleral injection, pupils normal.  Ears: Normal external structures, tympanic membranes.  Nose: No cartilage deformity, congestion.  Mouth: Normal teeth, gums, tongue, mucosa.  Throat: Normal, without erythema or exudate.  Neck: Normal, without thyromegaly  Nodes: No cervical, supraclavicular, axillary, inguinal adenopathy.  Lungs: Normal effort, clear to ausculation.  Heart: Regular rate and rhythm, S1 and S2, no murmurs; normal peripheral pulses and perfusion.  Abdomen: Soft, non-tender, without hepatomegaly, splenomegaly, or masses.  Skin: No inflammatory lesions.  Normal scratches and " bruises.  Nails: No pitting, infection.  Neurological: Alert, appropriately interactive, normal cranial nerves, no deficits, normal gait walking.   Musculoskeletal: No evidence of current synovitis of the cervical spine, TMJ, sternoclavicular, acromioclavicular, glenohumeral, elbow, wrist, finger, lumbar spine, hip, knee, ankle, or toe joints. No tendonitis or bursitis.    Axial Skeleton  Clearly no difficulty with touching her toes, extension, or twisting to reposition from supine to prone on the exam table.  She moves well.  Sacro-Iliac: R Tender;L Tender  (COIN) Sacroiliac tenderness:: Yes  (COIN) Positive GRACY test:: No  (COIN) Modified Schober's Test:: No  Entheses  (COIN) Tender Entheses count: 0    Total active joints:  2  Total limited joints:  0  Tender entheses count:  0         Last Lab Results:     No visits with results within 1 Day(s) from this visit.   Latest known visit with results is:   Office Visit on 05/24/2019   Component Date Value     Quantiferon-TB Gold Plus* 05/24/2019 Negative      TB1 Ag minus Nil Value 05/24/2019 0.11      TB2 Ag minus Nil Value 05/24/2019 0.09      Mitogen minus Nil Result 05/24/2019 >10.00      Nil Result 05/24/2019 0.21             Assessment:   History of sacroiliitis and enthesitis consistent with an axial spondyloarthropathy (ankylosing spondylitis).  Current therapies are working well for her.  There is a possibility of additional therapy at this time, and there certainly will be many options going forward as this group of conditions (spondyloarthropathies) as many new products in the pipeline.  I remain optimistic that she will do well in the long run.    Change Since Last Visit: Somewhat Worse  ACR Functional Class: Normal  (COIN) Provider Global Assessment Of Disease Activity: 1  (This is measured on the scale of 0 - 10)  (COIN) On Medication For Treatment Of AISHWARYA?: Yes  Health counseling reviewed:  transition       Plan:       1. Laboratory tests every 6 months  to monitor medications and disease activity.  They will track down the order set from November and have it forwarded to us at [phone]  2. No imaging is needed today.  3. Physical activity as tolerated.  What one can do tells us how well someone is doing, and is healthy for individuals with arthritis.   4. Medications: No change.  5. Follow-up in the summer, most likely an adult clinic but possibly here if we have additional issues to work through regarding transition.  I did clarify that I will not be following her as she enters graduate school or the workforce.    If there are any new questions or concerns, I would be glad to help and can be reached through our main office at [phone] or our paging  at [phone].    Ernesto Canela MD    This note was dictated and might contain unintended typographical errors missed in proofreading.  If there are questions/concerns, please contact the author.    CC  Patient Care Team:  Eros Quintana MD as PCP - General (Pediatrics)  Ernesto Canela MD as MD (Pediatrics)  Eros Quintana MD as Referring Physician (Pediatrics)  Yonis Christine Nathaniel T, MD as MD (Internal Medicine)  EROS QUINTANA    Copy to patient  Maria IsabelNery Thomas  [address] [address]

## 2020-01-07 ENCOUNTER — MYC REFILL (OUTPATIENT)
Dept: RHEUMATOLOGY | Facility: CLINIC | Age: 25
End: 2020-01-07

## 2020-01-07 DIAGNOSIS — M45.8 ANKYLOSING SPONDYLITIS OF SACRAL REGION (H): ICD-10-CM

## 2020-01-07 LAB
ABSOLUTE LYMPHOCYTES (EXTERNAL): 2449 (ref 850–3900)
ABSOLUTE NEUTROPHILS (EXTERNAL): 3630 (ref 1500–7800)
ALT SERPL-CCNC: 10 U/L (ref 6–29)
BLOOD URINE (EXTERNAL): ABNORMAL
CREATININE (EXTERNAL): 0.69 (ref 0.5–1.1)
CRP INFLAMMATION (EXTERNAL): 18.2
ERYTHROCYTE [SEDIMENTATION RATE] IN BLOOD: 15 MM/H (ref 0–20)
HEMOGLOBIN: 12.1 G/DL (ref 11.7–15.5)
PLATELET # BLD AUTO: 317 10^9/L (ref 140–400)
PROT UR QL: ABNORMAL NEGATIVE, TRACE, SMALL, MODERATE, LARGE
WBC # BLD AUTO: 6.6 10^9/L (ref 3.8–10.8)
WBC URINE (EXTERNAL): ABNORMAL (ref 2–5)

## 2020-01-07 RX ORDER — FOLIC ACID 1 MG/1
2 TABLET ORAL DAILY
Qty: 180 TABLET | Refills: 3 | Status: SHIPPED | OUTPATIENT
Start: 2020-01-07

## 2020-02-04 ENCOUNTER — MYC MEDICAL ADVICE (OUTPATIENT)
Dept: RHEUMATOLOGY | Facility: CLINIC | Age: 25
End: 2020-02-04

## 2020-02-04 DIAGNOSIS — M45.8 ANKYLOSING SPONDYLITIS OF SACRAL REGION (H): ICD-10-CM

## 2020-02-04 RX ORDER — PREDNISONE 20 MG/1
20 TABLET ORAL DAILY
Qty: 15 TABLET | Refills: 1 | Status: SHIPPED | OUTPATIENT
Start: 2020-02-04

## 2020-02-05 DIAGNOSIS — M65.90 TENOSYNOVITIS: ICD-10-CM

## 2020-02-05 DIAGNOSIS — M77.9 ENTHESITIS: ICD-10-CM

## 2020-02-05 DIAGNOSIS — M46.1 SACROILIITIS (H): Primary | ICD-10-CM

## 2020-02-05 DIAGNOSIS — M45.8 ANKYLOSING SPONDYLITIS OF SACRAL REGION (H): ICD-10-CM

## 2020-02-05 RX ORDER — MELOXICAM 15 MG/1
15 TABLET ORAL DAILY
Qty: 30 TABLET | Refills: 1 | Status: SHIPPED | OUTPATIENT
Start: 2020-02-05

## 2020-04-13 DIAGNOSIS — M45.8 ANKYLOSING SPONDYLITIS OF SACRAL REGION (H): ICD-10-CM

## 2020-11-16 ENCOUNTER — HEALTH MAINTENANCE LETTER (OUTPATIENT)
Age: 25
End: 2020-11-16

## 2021-09-18 ENCOUNTER — HEALTH MAINTENANCE LETTER (OUTPATIENT)
Age: 26
End: 2021-09-18

## 2021-11-13 ENCOUNTER — HEALTH MAINTENANCE LETTER (OUTPATIENT)
Age: 26
End: 2021-11-13

## 2022-11-19 ENCOUNTER — HEALTH MAINTENANCE LETTER (OUTPATIENT)
Age: 27
End: 2022-11-19

## 2022-12-28 ENCOUNTER — LAB REQUISITION (OUTPATIENT)
Dept: LAB | Facility: CLINIC | Age: 27
End: 2022-12-28
Payer: COMMERCIAL

## 2022-12-28 DIAGNOSIS — Z01.419 ENCOUNTER FOR GYNECOLOGICAL EXAMINATION (GENERAL) (ROUTINE) WITHOUT ABNORMAL FINDINGS: ICD-10-CM

## 2022-12-28 PROCEDURE — G0145 SCR C/V CYTO,THINLAYER,RESCR: HCPCS | Mod: ORL | Performed by: OBSTETRICS & GYNECOLOGY

## 2023-01-02 LAB
BKR LAB AP GYN ADEQUACY: NORMAL
BKR LAB AP GYN INTERPRETATION: NORMAL
BKR LAB AP HPV REFLEX: NORMAL
BKR LAB AP LMP: NORMAL
BKR LAB AP PREVIOUS ABNL DX: NORMAL
BKR LAB AP PREVIOUS ABNORMAL: NORMAL
PATH REPORT.COMMENTS IMP SPEC: NORMAL
PATH REPORT.COMMENTS IMP SPEC: NORMAL
PATH REPORT.RELEVANT HX SPEC: NORMAL

## 2023-04-09 ENCOUNTER — HEALTH MAINTENANCE LETTER (OUTPATIENT)
Age: 28
End: 2023-04-09

## 2025-06-22 NOTE — MR AVS SNAPSHOT
After Visit Summary   12/22/2017    Jaye Nicolas    MRN: [id number]           Patient Information     Date Of Birth          1995        Visit Information        Provider Department      12/22/2017 8:40 AM Ernesto Canela MD Peds Rheumatology        Today's Diagnoses     Ankylosing spondylitis of sacral region (H)    -  1      Care Instructions        [facility] Pediatric Rheumatology    For Help:  The Pediatric Call Center at [phone] can help with scheduling of routine follow up visits.  Diamond Stewart and Dana Pichardo are the Nurse Coordinators for the Division of Pediatric Rheumatology and can be reached directly at [phone]. They can help with questions about your child s rheumatic condition, medications, and test results.   Please try to schedule infusions 3 months in advance.  Please try to give us 72 hours or longer notice if you need to cancel infusions so other patients can benefit from this opening).  Note: Insurance authorization must be obtained before any infusion can be scheduled. If you change health insurance, you must notify our office as soon as possible, so that the infusion can be reauthorized.    For emergencies after hours or on the weekends, please call the page  at [phone] and ask to speak to the physician on-call for Pediatric Rheumatology. Please do not use [employer]AGE[employer] for urgent requests.  Main  Services:  [phone]  o Hmong/Japanese/Walter: [phone]  o Peruvian: [phone]  o Swedish: [phone]            Follow-ups after your visit        Follow-up notes from your care team     Return in about 6 months (around 6/22/2018) for Routine Visit.      Your next 10 appointments already scheduled     May 18, 2018  8:40 AM CDT   Return Visit with Ernesto Canela MD   Peds Rheumatology ([facility])    [facility]  12th Floor  [address] [address] [phone]             "  Who to contact     Please call your clinic at [phone] to:    Ask questions about your health    Make or cancel appointments    Discuss your medicines    Learn about your test results    Speak to your doctor   If you have compliments or concerns about an experience at your clinic, or if you wish to file a complaint, please contact [facility] Patient Relations at [phone] or email us at [email or web address]         Additional Information About Your Visit        CallFirehart Information     [employer] gives you secure access to your electronic health record. If you see a primary care provider, you can also send messages to your care team and make appointments. If you have questions, please call your primary care clinic.  If you do not have a primary care provider, please call [phone] and they will assist you.      StudyCloud is an electronic gateway that provides easy, online access to your medical records. With [employer], you can request a clinic appointment, read your test results, renew a prescription or communicate with your care team.     To access your existing account, please contact your [facility] or call [phone] for assistance.        Care EveryWhere ID     This is your Care EveryWhere ID. This could be used by other organizations to access your [facility] medical records  [id number]        Your Vitals Were     Pulse Temperature Height BMI (Body Mass Index)          71 98.1  F (36.7  C) (Oral) 5' 2.99\" (160 cm) 26.84 kg/m2         Blood Pressure from Last 3 Encounters:   12/22/17 105/62   05/15/17 109/75   12/22/16 107/62    Weight from Last 3 Encounters:   12/22/17 151 lb 7.3 oz (68.7 kg)   05/15/17 153 lb 3.5 oz (69.5 kg) (83 %)*   12/22/16 153 lb 3.5 oz (69.5 kg) (84 %)*     * Growth percentiles are based on CDC 2-20 Years data.              We Performed the Following     CBC with platelets differential     Creatinine     CRP " inflammation     Hepatic panel     RBC  Sed Rate     Routine UA with microscopic        Primary Care Provider Office Phone # Fax #    Xuan Quintana [phone]       [facility] [address] [place] [address]        Equal Access to Services     NOAH KOWALSKI : Hadii aad ku hadasho Soomaali, waaxda luqadaha, qaybta kaalmada adeegyada, waxay idiin hayaan adeeg khshaina ladaniela pena. So [facility] [phone].    ATENCIÓN: Si habla español, tiene a balderrama disposición servicios gratuitos de asistencia lingüística. Llame al [phone].    We comply with applicable federal civil rights laws and [place] laws. We do not discriminate on the basis of race, color, national origin, age, disability, sex, sexual orientation, or gender identity.            Thank you!     Thank you for choosing [facility] R[facility]  for your care. Our goal is always to provide you with excellent care. Hearing back from our patients is one way we can continue to improve our services. Please take a few minutes to complete the written survey that you may receive in the mail after your visit with us. Thank you!             Your Updated Medication List - Protect others around you: Learn how to safely use, store and throw away your medicines at www.disposemymeds.org.          This list is accurate as of: 12/22/17  9:31 AM.  Always use your most recent med list.                   Brand Name Dispense Instructions for use Diagnosis    BEYAZ 3-0.02-0.451 MG Tabs   Generic drug:  Drospiren-Eth Estrad-Levomefol      Take 1 tablet by mouth daily        Etanercept 50 MG/ML autoinjector    ENBREL SURECLICK    4 mL    Inject 50 mg Subcutaneous once a week    Ankylosing spondylitis (H)       folic acid 1 MG tablet    FOLVITE    180 tablet    Take 2 tablets (2 mg) by mouth daily    AISHWARYA (juvenile idiopathic arthritis), enthesitis related arthritis (H)       loratadine 10 MG tablet    CLARITIN     Take 10 mg by mouth daily    Ankylosing  spondylitis (H)       meloxicam 15 MG tablet    MOBIC    90 tablet    Take 1 tablet (15 mg) by mouth daily    Tenosynovitis, Sacroiliitis (H), Enthesitis       predniSONE 20 MG tablet    DELTASONE    15 tablet    Take 1 tablet (20 mg) by mouth daily    Ankylosing spondylitis (H)          Improved